# Patient Record
Sex: MALE | Race: BLACK OR AFRICAN AMERICAN | NOT HISPANIC OR LATINO | URBAN - METROPOLITAN AREA
[De-identification: names, ages, dates, MRNs, and addresses within clinical notes are randomized per-mention and may not be internally consistent; named-entity substitution may affect disease eponyms.]

---

## 2018-12-01 ENCOUNTER — OUTPATIENT (OUTPATIENT)
Dept: OUTPATIENT SERVICES | Facility: HOSPITAL | Age: 28
LOS: 1 days | End: 2018-12-01
Payer: MEDICAID

## 2018-12-01 PROCEDURE — G9001: CPT

## 2018-12-07 ENCOUNTER — EMERGENCY (EMERGENCY)
Facility: HOSPITAL | Age: 28
LOS: 1 days | Discharge: HOME | End: 2018-12-07
Attending: EMERGENCY MEDICINE | Admitting: EMERGENCY MEDICINE
Payer: MEDICAID

## 2018-12-07 VITALS
SYSTOLIC BLOOD PRESSURE: 128 MMHG | DIASTOLIC BLOOD PRESSURE: 84 MMHG | OXYGEN SATURATION: 100 % | TEMPERATURE: 99 F | HEART RATE: 91 BPM | RESPIRATION RATE: 16 BRPM

## 2018-12-07 VITALS
RESPIRATION RATE: 20 BRPM | SYSTOLIC BLOOD PRESSURE: 146 MMHG | TEMPERATURE: 101 F | OXYGEN SATURATION: 94 % | HEART RATE: 107 BPM | DIASTOLIC BLOOD PRESSURE: 105 MMHG

## 2018-12-07 LAB
ALBUMIN SERPL ELPH-MCNC: 4.7 G/DL — SIGNIFICANT CHANGE UP (ref 3.3–5)
ALP SERPL-CCNC: 102 U/L — SIGNIFICANT CHANGE UP (ref 40–120)
ALT FLD-CCNC: 37 U/L — SIGNIFICANT CHANGE UP (ref 4–41)
APPEARANCE UR: CLEAR — SIGNIFICANT CHANGE UP
APTT BLD: 31 SEC — SIGNIFICANT CHANGE UP (ref 27.5–36.3)
AST SERPL-CCNC: 28 U/L — SIGNIFICANT CHANGE UP (ref 4–40)
B PERT DNA SPEC QL NAA+PROBE: NOT DETECTED — SIGNIFICANT CHANGE UP
BASE EXCESS BLDV CALC-SCNC: 4.9 MMOL/L — SIGNIFICANT CHANGE UP
BASE EXCESS BLDV CALC-SCNC: 5 MMOL/L — SIGNIFICANT CHANGE UP
BASOPHILS # BLD AUTO: 0.03 K/UL — SIGNIFICANT CHANGE UP (ref 0–0.2)
BASOPHILS NFR BLD AUTO: 0.3 % — SIGNIFICANT CHANGE UP (ref 0–2)
BASOPHILS NFR SPEC: 0 % — SIGNIFICANT CHANGE UP (ref 0–2)
BILIRUB SERPL-MCNC: < 0.2 MG/DL — LOW (ref 0.2–1.2)
BILIRUB UR-MCNC: NEGATIVE — SIGNIFICANT CHANGE UP
BLASTS # FLD: 0 % — SIGNIFICANT CHANGE UP (ref 0–0)
BLOOD GAS VENOUS - CREATININE: 0.65 MG/DL — SIGNIFICANT CHANGE UP (ref 0.5–1.3)
BLOOD GAS VENOUS - CREATININE: 0.94 MG/DL — SIGNIFICANT CHANGE UP (ref 0.5–1.3)
BLOOD UR QL VISUAL: SIGNIFICANT CHANGE UP
BUN SERPL-MCNC: 15 MG/DL — SIGNIFICANT CHANGE UP (ref 7–23)
C PNEUM DNA SPEC QL NAA+PROBE: NOT DETECTED — SIGNIFICANT CHANGE UP
CALCIUM SERPL-MCNC: 9.4 MG/DL — SIGNIFICANT CHANGE UP (ref 8.4–10.5)
CHLORIDE BLDV-SCNC: 104 MMOL/L — SIGNIFICANT CHANGE UP (ref 96–108)
CHLORIDE BLDV-SCNC: 105 MMOL/L — SIGNIFICANT CHANGE UP (ref 96–108)
CHLORIDE SERPL-SCNC: 100 MMOL/L — SIGNIFICANT CHANGE UP (ref 98–107)
CO2 SERPL-SCNC: 28 MMOL/L — SIGNIFICANT CHANGE UP (ref 22–31)
COLOR SPEC: SIGNIFICANT CHANGE UP
CREAT SERPL-MCNC: 1 MG/DL — SIGNIFICANT CHANGE UP (ref 0.5–1.3)
EOSINOPHIL # BLD AUTO: 0.01 K/UL — SIGNIFICANT CHANGE UP (ref 0–0.5)
EOSINOPHIL NFR BLD AUTO: 0.1 % — SIGNIFICANT CHANGE UP (ref 0–6)
EOSINOPHIL NFR FLD: 0 % — SIGNIFICANT CHANGE UP (ref 0–6)
FLUAV H1 2009 PAND RNA SPEC QL NAA+PROBE: NOT DETECTED — SIGNIFICANT CHANGE UP
FLUAV H1 RNA SPEC QL NAA+PROBE: NOT DETECTED — SIGNIFICANT CHANGE UP
FLUAV H3 RNA SPEC QL NAA+PROBE: NOT DETECTED — SIGNIFICANT CHANGE UP
FLUAV SUBTYP SPEC NAA+PROBE: SIGNIFICANT CHANGE UP
FLUBV RNA SPEC QL NAA+PROBE: NOT DETECTED — SIGNIFICANT CHANGE UP
GAS PNL BLDV: 137 MMOL/L — SIGNIFICANT CHANGE UP (ref 136–146)
GAS PNL BLDV: 141 MMOL/L — SIGNIFICANT CHANGE UP (ref 136–146)
GLUCOSE BLDV-MCNC: 123 — HIGH (ref 70–99)
GLUCOSE BLDV-MCNC: 78 — SIGNIFICANT CHANGE UP (ref 70–99)
GLUCOSE SERPL-MCNC: 118 MG/DL — HIGH (ref 70–99)
GLUCOSE UR-MCNC: NEGATIVE — SIGNIFICANT CHANGE UP
HADV DNA SPEC QL NAA+PROBE: NOT DETECTED — SIGNIFICANT CHANGE UP
HCO3 BLDV-SCNC: 26 MMOL/L — SIGNIFICANT CHANGE UP (ref 20–27)
HCO3 BLDV-SCNC: 27 MMOL/L — SIGNIFICANT CHANGE UP (ref 20–27)
HCOV PNL SPEC NAA+PROBE: SIGNIFICANT CHANGE UP
HCT VFR BLD CALC: 47.7 % — SIGNIFICANT CHANGE UP (ref 39–50)
HCT VFR BLDV CALC: 42.6 % — SIGNIFICANT CHANGE UP (ref 39–51)
HCT VFR BLDV CALC: 47.7 % — SIGNIFICANT CHANGE UP (ref 39–51)
HGB BLD-MCNC: 15.3 G/DL — SIGNIFICANT CHANGE UP (ref 13–17)
HGB BLDV-MCNC: 13.9 G/DL — SIGNIFICANT CHANGE UP (ref 13–17)
HGB BLDV-MCNC: 15.6 G/DL — SIGNIFICANT CHANGE UP (ref 13–17)
HMPV RNA SPEC QL NAA+PROBE: NOT DETECTED — SIGNIFICANT CHANGE UP
HPIV1 RNA SPEC QL NAA+PROBE: NOT DETECTED — SIGNIFICANT CHANGE UP
HPIV2 RNA SPEC QL NAA+PROBE: NOT DETECTED — SIGNIFICANT CHANGE UP
HPIV3 RNA SPEC QL NAA+PROBE: NOT DETECTED — SIGNIFICANT CHANGE UP
HPIV4 RNA SPEC QL NAA+PROBE: NOT DETECTED — SIGNIFICANT CHANGE UP
IMM GRANULOCYTES # BLD AUTO: 0.04 # — SIGNIFICANT CHANGE UP
IMM GRANULOCYTES NFR BLD AUTO: 0.4 % — SIGNIFICANT CHANGE UP (ref 0–1.5)
INR BLD: 1.08 — SIGNIFICANT CHANGE UP (ref 0.88–1.17)
KETONES UR-MCNC: NEGATIVE — SIGNIFICANT CHANGE UP
LACTATE BLDV-MCNC: 2.1 MMOL/L — HIGH (ref 0.5–2)
LACTATE BLDV-MCNC: 2.5 MMOL/L — HIGH (ref 0.5–2)
LEUKOCYTE ESTERASE UR-ACNC: NEGATIVE — SIGNIFICANT CHANGE UP
LYMPHOCYTES # BLD AUTO: 0.45 K/UL — LOW (ref 1–3.3)
LYMPHOCYTES # BLD AUTO: 4.5 % — LOW (ref 13–44)
LYMPHOCYTES NFR SPEC AUTO: 0.9 % — LOW (ref 13–44)
MCHC RBC-ENTMCNC: 28.4 PG — SIGNIFICANT CHANGE UP (ref 27–34)
MCHC RBC-ENTMCNC: 32.1 % — SIGNIFICANT CHANGE UP (ref 32–36)
MCV RBC AUTO: 88.7 FL — SIGNIFICANT CHANGE UP (ref 80–100)
METAMYELOCYTES # FLD: 0.9 % — SIGNIFICANT CHANGE UP (ref 0–1)
MICROCYTES BLD QL: SLIGHT — SIGNIFICANT CHANGE UP
MONOCYTES # BLD AUTO: 0.77 K/UL — SIGNIFICANT CHANGE UP (ref 0–0.9)
MONOCYTES NFR BLD AUTO: 7.7 % — SIGNIFICANT CHANGE UP (ref 2–14)
MONOCYTES NFR BLD: 5.3 % — SIGNIFICANT CHANGE UP (ref 2–9)
MYELOCYTES NFR BLD: 0 % — SIGNIFICANT CHANGE UP (ref 0–0)
NEUTROPHIL AB SER-ACNC: 90.2 % — HIGH (ref 43–77)
NEUTROPHILS # BLD AUTO: 8.76 K/UL — HIGH (ref 1.8–7.4)
NEUTROPHILS NFR BLD AUTO: 87 % — HIGH (ref 43–77)
NEUTS BAND # BLD: 0 % — SIGNIFICANT CHANGE UP (ref 0–6)
NITRITE UR-MCNC: NEGATIVE — SIGNIFICANT CHANGE UP
NRBC # FLD: 0 — SIGNIFICANT CHANGE UP
OTHER - HEMATOLOGY %: 0 — SIGNIFICANT CHANGE UP
PCO2 BLDV: 53 MMHG — HIGH (ref 41–51)
PCO2 BLDV: 60 MMHG — HIGH (ref 41–51)
PH BLDV: 7.33 PH — SIGNIFICANT CHANGE UP (ref 7.32–7.43)
PH BLDV: 7.37 PH — SIGNIFICANT CHANGE UP (ref 7.32–7.43)
PH UR: 6.5 — SIGNIFICANT CHANGE UP (ref 5–8)
PHENYTOIN FREE SERPL-MCNC: 18.7 UG/ML — SIGNIFICANT CHANGE UP (ref 10–20)
PLATELET # BLD AUTO: 227 K/UL — SIGNIFICANT CHANGE UP (ref 150–400)
PLATELET COUNT - ESTIMATE: NORMAL — SIGNIFICANT CHANGE UP
PMV BLD: 10.7 FL — SIGNIFICANT CHANGE UP (ref 7–13)
PO2 BLDV: 32 MMHG — LOW (ref 35–40)
PO2 BLDV: 42 MMHG — HIGH (ref 35–40)
POTASSIUM BLDV-SCNC: 3.6 MMOL/L — SIGNIFICANT CHANGE UP (ref 3.4–4.5)
POTASSIUM BLDV-SCNC: 3.8 MMOL/L — SIGNIFICANT CHANGE UP (ref 3.4–4.5)
POTASSIUM SERPL-MCNC: 3.4 MMOL/L — LOW (ref 3.5–5.3)
POTASSIUM SERPL-SCNC: 3.4 MMOL/L — LOW (ref 3.5–5.3)
PROMYELOCYTES # FLD: 0 % — SIGNIFICANT CHANGE UP (ref 0–0)
PROT SERPL-MCNC: 8.3 G/DL — SIGNIFICANT CHANGE UP (ref 6–8.3)
PROT UR-MCNC: 10 — SIGNIFICANT CHANGE UP
PROTHROM AB SERPL-ACNC: 12.4 SEC — SIGNIFICANT CHANGE UP (ref 9.8–13.1)
RBC # BLD: 5.38 M/UL — SIGNIFICANT CHANGE UP (ref 4.2–5.8)
RBC # FLD: 14.4 % — SIGNIFICANT CHANGE UP (ref 10.3–14.5)
RSV RNA SPEC QL NAA+PROBE: NOT DETECTED — SIGNIFICANT CHANGE UP
RV+EV RNA SPEC QL NAA+PROBE: NOT DETECTED — SIGNIFICANT CHANGE UP
SAO2 % BLDV: 52.1 % — LOW (ref 60–85)
SAO2 % BLDV: 72.7 % — SIGNIFICANT CHANGE UP (ref 60–85)
SODIUM SERPL-SCNC: 142 MMOL/L — SIGNIFICANT CHANGE UP (ref 135–145)
SP GR SPEC: 1.01 — SIGNIFICANT CHANGE UP (ref 1–1.04)
UROBILINOGEN FLD QL: NORMAL — SIGNIFICANT CHANGE UP
VARIANT LYMPHS # BLD: 2.7 % — SIGNIFICANT CHANGE UP
WBC # BLD: 10.06 K/UL — SIGNIFICANT CHANGE UP (ref 3.8–10.5)
WBC # FLD AUTO: 10.06 K/UL — SIGNIFICANT CHANGE UP (ref 3.8–10.5)

## 2018-12-07 PROCEDURE — 71045 X-RAY EXAM CHEST 1 VIEW: CPT | Mod: 26

## 2018-12-07 PROCEDURE — 99285 EMERGENCY DEPT VISIT HI MDM: CPT | Mod: 25

## 2018-12-07 PROCEDURE — 70450 CT HEAD/BRAIN W/O DYE: CPT | Mod: 26

## 2018-12-07 PROCEDURE — 99282 EMERGENCY DEPT VISIT SF MDM: CPT

## 2018-12-07 RX ORDER — OXCARBAZEPINE 300 MG/1
300 TABLET, FILM COATED ORAL ONCE
Qty: 0 | Refills: 0 | Status: DISCONTINUED | OUTPATIENT
Start: 2018-12-07 | End: 2018-12-11

## 2018-12-07 RX ORDER — OXCARBAZEPINE 300 MG/1
300 TABLET, FILM COATED ORAL ONCE
Qty: 0 | Refills: 0 | Status: DISCONTINUED | OUTPATIENT
Start: 2018-12-07 | End: 2018-12-07

## 2018-12-07 RX ORDER — LEVETIRACETAM 250 MG/1
1000 TABLET, FILM COATED ORAL ONCE
Qty: 0 | Refills: 0 | Status: COMPLETED | OUTPATIENT
Start: 2018-12-07 | End: 2018-12-07

## 2018-12-07 RX ORDER — ACETAMINOPHEN 500 MG
650 TABLET ORAL ONCE
Qty: 0 | Refills: 0 | Status: COMPLETED | OUTPATIENT
Start: 2018-12-07 | End: 2018-12-07

## 2018-12-07 RX ORDER — SODIUM CHLORIDE 9 MG/ML
2000 INJECTION, SOLUTION INTRAVENOUS ONCE
Qty: 0 | Refills: 0 | Status: COMPLETED | OUTPATIENT
Start: 2018-12-07 | End: 2018-12-07

## 2018-12-07 RX ADMIN — Medication 650 MILLIGRAM(S): at 07:50

## 2018-12-07 RX ADMIN — Medication 650 MILLIGRAM(S): at 08:20

## 2018-12-07 RX ADMIN — SODIUM CHLORIDE 2000 MILLILITER(S): 9 INJECTION, SOLUTION INTRAVENOUS at 07:49

## 2018-12-07 RX ADMIN — LEVETIRACETAM 1000 MILLIGRAM(S): 250 TABLET, FILM COATED ORAL at 08:25

## 2018-12-07 NOTE — ED ADULT TRIAGE NOTE - CHIEF COMPLAINT QUOTE
pt. BIBA from home w/ c/o witnessed seizure-like activity at home, pt.'s brother reports the pt.'s R arm shaking uncontrollably for over a minute.  Pt. complaints w/ meds.  Febrile and tachycardic in triage.  PMHx seizure, developmental delay.

## 2018-12-07 NOTE — CONSULT NOTE ADULT - SUBJECTIVE AND OBJECTIVE BOX
Neurology Consult    Reason for consult: Seizure    HPI:  28yom w/ developmental delay,  shunt and seizures p/w RUE/RLE twitching. No change in mental status, consistent with prior seizure semiology.     REVIEW OF SYSTEMS:  As above    MEDICATIONS  levETIRAcetam 1000 milliGRAM(s) Oral once    PMH: Developmental delay  Seizure    PSH:     FAMILY HISTORY:    No history of dementia, strokes, or seizures     SOCIAL HISTORY:  No history of tobacco or alcohol use     Allergies    No Known Allergies    Intolerances    Vital Signs Last 24 Hrs  T(C): 38.4 (07 Dec 2018 06:22), Max: 38.4 (07 Dec 2018 06:22)  T(F): 101.2 (07 Dec 2018 06:22), Max: 101.2 (07 Dec 2018 06:22)  HR: 97 (07 Dec 2018 08:02) (97 - 107)  BP: 128/73 (07 Dec 2018 08:02) (128/73 - 146/105)  BP(mean): --  RR: 16 (07 Dec 2018 08:02) (16 - 20)  SpO2: 98% (07 Dec 2018 08:02) (94% - 98%)    Neurological Examination:    Mental Status: Patient is alert, awake, oriented X3. Patient is fluent, no dysarthria, no aphasia. Follows commands well and able to answer questions appropriately. Mood and affect normal.    Cranial Nerves: PERRL, EOMI, visual field intact, V1-V3 intact, no gross facial asymmetry, tongue/uvula midline    Motor Exam: No drift  Right upper extremity: 5/5  Left upper extremity: 5/5  Right lower extremity: 5/5  Left lower extremity: 5/5    Normal bulk/tone    Sensory: Intact to light touch and pinprick bilaterally. No extinction    Coordination: Finger to nose intact bilaterally     Gait: Normal      Reflexes: Bilateral 2+ Biceps, Brachial, Patellar, Ankle  Plantar: Down bilateral    GENERAL: No acute distress  HEENT:  Normocephalic, atraumatic  EXTREMITIES: No edema, clubbing, cyanosis  MUSCULOSKELETAL: Normal range of motion  SKIN: No rashes    LABS:    Hemoglobin A1C:     PT/INR - ( 07 Dec 2018 07:40 )   PT: 12.4 SEC;   INR: 1.08        PTT - ( 07 Dec 2018 07:40 )  PTT:31.0 SEC    RADIOLOGY:  CT head:  MRI: Neurology Consult    Reason for consult: Seizure    HPI:  28yom w/ developmental delay,  shunt and seizures p/w RUE twitching. No change in mental status, consistent with prior seizure semiology. First event occurred 12/6 1800, coming out of the bathroom, had a feeling he was going to have a seizure(unable to elaborate/describe feeling), lasted 2 minutes. Second event occurred in ambulance, patient doesn't remember event, described to him by brother. Patient urinated on himself during second event, no post ictal confusion, no tongue biting. Usually seizures come once a year, last 1 minute, semiology RUE twitching. Has had seizures since he was a child. Has been consistent taking medications, sees a neurologist at [facility] outpatient for management of seizure    REVIEW OF SYSTEMS:  As above    MEDICATIONS  levETIRAcetam 1000 milliGRAM(s) Oral once    PMH: Developmental delay  Seizure    PSH:     FAMILY HISTORY:    No history of dementia, strokes, or seizures     SOCIAL HISTORY:  No history of tobacco or alcohol use     Allergies    No Known Allergies    Intolerances    Vital Signs Last 24 Hrs  T(C): 38.4 (07 Dec 2018 06:22), Max: 38.4 (07 Dec 2018 06:22)  T(F): 101.2 (07 Dec 2018 06:22), Max: 101.2 (07 Dec 2018 06:22)  HR: 97 (07 Dec 2018 08:02) (97 - 107)  BP: 128/73 (07 Dec 2018 08:02) (128/73 - 146/105)  BP(mean): --  RR: 16 (07 Dec 2018 08:02) (16 - 20)  SpO2: 98% (07 Dec 2018 08:02) (94% - 98%)    Neurological Examination:    Mental Status: Patient is alert, awake, oriented X3. Patient is fluent, no dysarthria, no aphasia. Follows commands well and able to answer questions appropriately. Mood and affect normal.    Cranial Nerves: PERRL, EOMI, visual field intact, V1-V3 intact, no gross facial asymmetry, tongue/uvula midline    Motor Exam: No drift  Right upper extremity: 5/5  Left upper extremity: 5/5  Right lower extremity: 5/5  Left lower extremity: 5/5    Normal bulk/tone    Sensory: Intact to light touch and pinprick bilaterally. No extinction    Coordination: Finger to nose intact bilaterally     Gait: Normal      Reflexes: Bilateral 2+ Biceps, Brachial, Patellar, Ankle  Plantar: Down bilateral    GENERAL: No acute distress  HEENT:  Normocephalic, atraumatic  EXTREMITIES: No edema, clubbing, cyanosis  MUSCULOSKELETAL: Normal range of motion  SKIN: No rashes    LABS:    Hemoglobin A1C:     PT/INR - ( 07 Dec 2018 07:40 )   PT: 12.4 SEC;   INR: 1.08        PTT - ( 07 Dec 2018 07:40 )  PTT:31.0 SEC    RADIOLOGY:  CT head:  MRI: Neurology Consult    Reason for consult: Seizure    HPI:  28yom w/ developmental delay,  shunt and seizures p/w RUE twitching. No change in mental status, consistent with prior seizure semiology. First event occurred 12/6 1800, coming out of the bathroom, had a feeling he was going to have a seizure(unable to elaborate/describe feeling), lasted 2 minutes. Second event occurred in ambulance, patient doesn't remember event, described to him by brother. Patient urinated on himself during second event, no post ictal confusion, no tongue biting. Usually seizures come once a year, last 1 minute, semiology RUE twitching. Has had seizures since he was a child. Has been consistent taking medications, sees a neurologist at [facility] outpatient for management of seizure    REVIEW OF SYSTEMS:  As above    MEDICATIONS  levETIRAcetam 1000 milliGRAM(s) Oral once    PMH: Developmental delay  Seizure    PSH:     FAMILY HISTORY:    No history of dementia, strokes, or seizures     SOCIAL HISTORY:  No history of tobacco or alcohol use     Allergies    No Known Allergies    Intolerances    Vital Signs Last 24 Hrs  T(C): 38.4 (07 Dec 2018 06:22), Max: 38.4 (07 Dec 2018 06:22)  T(F): 101.2 (07 Dec 2018 06:22), Max: 101.2 (07 Dec 2018 06:22)  HR: 97 (07 Dec 2018 08:02) (97 - 107)  BP: 128/73 (07 Dec 2018 08:02) (128/73 - 146/105)  BP(mean): --  RR: 16 (07 Dec 2018 08:02) (16 - 20)  SpO2: 98% (07 Dec 2018 08:02) (94% - 98%)    Neurological Examination:    Mental Status: Patient is alert, awake, oriented. Patient is fluent, no dysarthria, no aphasia. Follows commands well and able to answer questions appropriately. Mood and affect normal.    Cranial Nerves: PERRL, EOMI, visual field intact, V1-V3 intact, no gross facial asymmetry, tongue/uvula midline    Motor Exam: No drift  Right upper extremity: Hand  impaired due to R hand deformity, 5/5 otherwise  Left upper extremity: 5/5  Right lower extremity: 5/5  Left lower extremity: 5/5    Normal bulk/tone    Sensory: Intact to light touch bilaterally. No extinction    Coordination: Finger to nose intact bilaterally     Reflexes: Bilateral 2+ Biceps, Brachial, Patellar, Ankle  Plantar: Down bilateral    GENERAL: No acute distress  HEENT:  Normocephalic, atraumatic  EXTREMITIES: No edema, clubbing, cyanosis  MUSCULOSKELETAL: Normal range of motion  SKIN: No rashes    LABS:    Hemoglobin A1C:     PT/INR - ( 07 Dec 2018 07:40 )   PT: 12.4 SEC;   INR: 1.08        PTT - ( 07 Dec 2018 07:40 )  PTT:31.0 SEC    RADIOLOGY:  CT head:  MRI: Neurology Consult    Reason for consult: Seizure    HPI:  28yom w/ developmental delay,  shunt and seizures p/w RUE twitching. No change in mental status, consistent with prior seizure semiology. First event occurred 12/6 1800, coming out of the bathroom, had a feeling he was going to have a seizure(unable to elaborate/describe feeling), lasted 2 minutes. Second event occurred in ambulance, patient doesn't remember event, described to him by brother. Patient urinated on himself during second event, no post ictal confusion, no tongue biting. Usually seizures come once a year, last 1 minute, semiology RUE twitching. Has had seizures since he was a child. Has been consistent taking medications, sees a neurologist at [facility] outpatient for management of seizure. Denies headache, nausea, vomiting, neck stiffness.     REVIEW OF SYSTEMS:  As above    MEDICATIONS  Phenytoin 100mg ER BID  Keppra 1g TID  Trileptal 300mg TID    PMH: Developmental delay  Seizure    PSH:     FAMILY HISTORY:    No history of dementia, strokes, or seizures     SOCIAL HISTORY:  No history of tobacco or alcohol use     Allergies    No Known Allergies    Intolerances    Vital Signs Last 24 Hrs  T(C): 38.4 (07 Dec 2018 06:22), Max: 38.4 (07 Dec 2018 06:22)  T(F): 101.2 (07 Dec 2018 06:22), Max: 101.2 (07 Dec 2018 06:22)  HR: 97 (07 Dec 2018 08:02) (97 - 107)  BP: 128/73 (07 Dec 2018 08:02) (128/73 - 146/105)  BP(mean): --  RR: 16 (07 Dec 2018 08:02) (16 - 20)  SpO2: 98% (07 Dec 2018 08:02) (94% - 98%)    Neurological Examination:    Mental Status: Patient is alert, awake, oriented. Patient is fluent, no dysarthria, no aphasia. Follows commands well and able to answer questions appropriately. Mood and affect normal.    Cranial Nerves: PERRL, EOMI, visual field intact, V1-V3 intact, no gross facial asymmetry, tongue/uvula midline    Motor Exam: No drift  Right upper extremity: Hand  impaired due to R hand deformity, 5/5 otherwise  Left upper extremity: 5/5  Right lower extremity: 5/5  Left lower extremity: 5/5    Kernig/Brudzinski negative    Normal bulk/tone    Sensory: Intact to light touch bilaterally. No extinction    Coordination: Finger to nose intact bilaterally     Reflexes: Bilateral 2+ Biceps, Brachial, Patellar, Ankle  Plantar: Down bilateral    GENERAL: No acute distress  HEENT:  Normocephalic, atraumatic  EXTREMITIES: No edema, clubbing, cyanosis  MUSCULOSKELETAL: Normal range of motion  SKIN: No rashes    LABS:    Hemoglobin A1C:     PT/INR - ( 07 Dec 2018 07:40 )   PT: 12.4 SEC;   INR: 1.08        PTT - ( 07 Dec 2018 07:40 )  PTT:31.0 SEC    RADIOLOGY:  CT head:  MRI:

## 2018-12-07 NOTE — ED ADULT NURSE NOTE - NSIMPLEMENTINTERV_GEN_ALL_ED
Implemented All Fall with Harm Risk Interventions:  [place] to call system. Call bell, personal items and telephone within reach. Instruct patient to call for assistance. Room bathroom lighting operational. Non-slip footwear when patient is off stretcher. Physically safe environment: no spills, clutter or unnecessary equipment. Stretcher in lowest position, wheels locked, appropriate side rails in place. Provide visual cue, wrist band, yellow gown, etc. Monitor gait and stability. Monitor for mental status changes and reorient to person, place, and time. Review medications for side effects contributing to fall risk. Reinforce activity limits and safety measures with patient and family. Provide visual clues: red socks.

## 2018-12-07 NOTE — ED PROVIDER NOTE - CARE PROVIDER_API CALL
Guido Husain), Clinical Neurophysiology; Neurology  [address] [address]  Phone: [phone]  Fax: [phone]

## 2018-12-07 NOTE — ED PROVIDER NOTE - OBJECTIVE STATEMENT
28yom w/ developmental delay,  shunt and seizures had two witnessed seizures this morning, the first lasting about a minute with focal right arm and leg twitching, no change in mental status or post ictal period, consistent with usual seizure. Had a second episode of the same during ambulance transport. No intervention prior to ED arrival. Endorses a mild, right sided headache which is normal after seizure. Taking dilantin, keppra and tegretol with adherence to regimen. No recent fevers, chills, cough, congestion, abdominal pain, nausea, vomiting, diarrhea, urinary symptoms. No sick contacts, travel or known exposures. Last seizure approx 3 months ago, no recent medication adjustments. Follows w/ [facility] clinic. 28yom w/ developmental delay,  shunt and seizures had two witnessed seizures this morning, the first lasting about a minute with focal right arm and leg twitching, no change in mental status or post ictal period, consistent with usual seizure. Had a second episode of the same during ambulance transport. No intervention prior to ED arrival. Endorses a mild, right sided headache which is normal after seizure. Taking dilantin, keppra and trileptal with adherence to regimen. No recent fevers, chills, cough, congestion, abdominal pain, nausea, vomiting, diarrhea, urinary symptoms. No sick contacts, travel or known exposures. Last seizure approx 3 months ago, no recent medication adjustments. Follows w/ [facility] clinic.

## 2018-12-07 NOTE — CONSULT NOTE ADULT - ATTENDING COMMENTS
Patient reports 2 breakthough right arm focal motor seizures this morning.  Compliant with taking AEDs.  Need to confirm AED doses, before making AED adjustments.   Will discuss further with neurology residents.   Agree with CEEG monitoring.  Seizure precautions.    Guido Husain MD  EEG / Epilepsy Attending Physician

## 2018-12-07 NOTE — ED PROVIDER NOTE - ATTENDING CONTRIBUTION TO CARE
28M H/o DD s/p  shunt on keppra dilantin, tegretol.  Usual sz, focal R side arm twitching, non post ictal or incontinence.  Happened again in EMS.  Found to be febrile here and tachycardia.  Verbal here, c/o mild HA, no meningismus.  Exam benign here aside from VS abnormality.  Pt was admitted in 2015 with a very similar story, had VEEG, no acute findings, pt was d/c home.  Pt follows up with Nsx and neuro at [facility].  Sz meds - dilantin, keppra, oxcarbamazepine.  Fever likely due to sz, no localizing sign - but will check labs lactate, CXR, urine; neuro.     VS:  unremarkable    GEN - malaise, ; A+O x3   HEAD - NC/AT     ENT - PEERL, EOMI, mucous membranes  moist , no discharge      NECK: Neck supple, non-tender without lymphadenopathy, no masses, no JVD  PULM - CTA b/l,  symmetric breath sounds  COR -  normal heart sounds    ABD - , ND, NT, soft,  BACK - no CVA tenderness, nontender spine     EXTREMS - no edema, no deformity, warm and well perfused  R arm and leg contracture/weakness (old)  SKIN - no rash or bruising      NEUROLOGIC - alert, CN 2-12 intact, sensation nl, motor no focal deficit.

## 2018-12-07 NOTE — ED PROVIDER NOTE - MEDICAL DECISION MAKING DETAILS
Known seizure history with two breakthrough seizures, now found to be febrile and tachycardic. No clear source of infection apparent in history or physical. Will get cultures, UA, CXR and RVP given SIRS response, however this may be due to seizure activity and since the pt does not have any signs of systemic infection, will hold antibiotics pending work up.

## 2018-12-07 NOTE — ED PROVIDER NOTE - NSFOLLOWUPINSTRUCTIONS_ED_ALL_ED_FT
Follow up with your neurologist, or you may follow up with Dr. Husain. Call [phone] to schedule an appointment.   Continue Keppra and DIlantin as prescribed.  Increase Trileptal (Oxcarbamazepine) to 300mg in the morning, 300mg in the afternoon, and 600mg at night.  Return to the ER immediately for persistent seizure activity, recurrent fever, chills, cough, abdominal pain, nausea, vomiting, diarrhea, headache or any other new symptoms.

## 2018-12-07 NOTE — ED ADULT NURSE NOTE - OBJECTIVE STATEMENT
Pt received to room 26 a/o x 3 sp 2 witnessessed seizures. pt had one seizure at home and one in the ambulance to the hospital. Pt brother states he has right sided seizures which is typical for him and they only lasted for alittle more then a min. Pt states he is compliant with his medications and his last seizure was 3 months ago. Respirations even and unlabored. Lung sounds clear with equal chest rise bilaterally. ABD is soft, non tender, non distended with normal active bowel sounds No complaints of chest pain, headache, nausea, dizziness, vomiting  SOB, fever, chills verbalized.. 22g IV placed to left AC labs drawn ans sent. Pt given medication and fluids as per order. Pt noted to be tachy and febril at triage

## 2018-12-07 NOTE — CONSULT NOTE ADULT - ASSESSMENT
28yom w/ developmental delay,  shunt and seizures p/w RUE/RLE twitching. No change in mental status, consistent with prior seizure semiology.     Recs: 28yom w/ developmental delay,  shunt and seizures p/w RUE/RLE twitching. No change in mental status, consistent with prior seizure semiology. Patient febrile(101.2), concern for breakthrough seizure due to infection or possible toxic/metabolic disturbance. Kernig/brudzinski negative, no headache or leukocytosis; suspicion low for meningitis/encephalitis at this time, but would consider tapping  shunt if toxic/metabolic/infectious workup otherwise negative.     Recs:  vEEG  Dilantin, trileptal levels  Would attempt to contact neurologist outpatient with regards to management of AEDs  Consider neurosurgery consult for  shunt tap

## 2018-12-08 LAB
BACTERIA UR CULT: SIGNIFICANT CHANGE UP
SPECIMEN SOURCE: SIGNIFICANT CHANGE UP

## 2018-12-10 DIAGNOSIS — Z71.89 OTHER SPECIFIED COUNSELING: ICD-10-CM

## 2018-12-12 LAB
BACTERIA BLD CULT: SIGNIFICANT CHANGE UP
BACTERIA BLD CULT: SIGNIFICANT CHANGE UP
OXCARBAZEPINE SERPL-MCNC: 9 UG/ML — LOW (ref 10–35)

## 2019-01-28 ENCOUNTER — EMERGENCY (EMERGENCY)
Facility: HOSPITAL | Age: 29
LOS: 0 days | Discharge: HOME | End: 2019-01-28
Attending: EMERGENCY MEDICINE
Payer: MEDICAID

## 2019-01-28 VITALS
HEIGHT: 66 IN | SYSTOLIC BLOOD PRESSURE: 77 MMHG | RESPIRATION RATE: 18 BRPM | HEART RATE: 115 BPM | OXYGEN SATURATION: 97 % | WEIGHT: 179.9 LBS | TEMPERATURE: 101 F | DIASTOLIC BLOOD PRESSURE: 43 MMHG

## 2019-01-28 VITALS
OXYGEN SATURATION: 96 % | SYSTOLIC BLOOD PRESSURE: 115 MMHG | DIASTOLIC BLOOD PRESSURE: 79 MMHG | RESPIRATION RATE: 19 BRPM | HEART RATE: 86 BPM

## 2019-01-28 DIAGNOSIS — I10 ESSENTIAL (PRIMARY) HYPERTENSION: ICD-10-CM

## 2019-01-28 DIAGNOSIS — G40.909 EPILEPSY, UNSPECIFIED, NOT INTRACTABLE, WITHOUT STATUS EPILEPTICUS: ICD-10-CM

## 2019-01-28 DIAGNOSIS — J18.9 PNEUMONIA, UNSPECIFIED ORGANISM: ICD-10-CM

## 2019-01-28 LAB
ALBUMIN SERPL ELPH-MCNC: 3 G/DL — LOW (ref 3.3–5)
ALP SERPL-CCNC: 71 U/L — SIGNIFICANT CHANGE UP (ref 40–120)
ALT FLD-CCNC: 39 U/L — SIGNIFICANT CHANGE UP (ref 12–78)
ANION GAP SERPL CALC-SCNC: 16 MMOL/L — SIGNIFICANT CHANGE UP (ref 5–17)
APPEARANCE UR: CLEAR — SIGNIFICANT CHANGE UP
AST SERPL-CCNC: 19 U/L — SIGNIFICANT CHANGE UP (ref 15–37)
BACTERIA # UR AUTO: ABNORMAL
BASOPHILS # BLD AUTO: 0.03 K/UL — SIGNIFICANT CHANGE UP (ref 0–0.2)
BASOPHILS NFR BLD AUTO: 0.2 % — SIGNIFICANT CHANGE UP (ref 0–2)
BILIRUB SERPL-MCNC: 0.1 MG/DL — LOW (ref 0.2–1.2)
BILIRUB UR-MCNC: NEGATIVE — SIGNIFICANT CHANGE UP
BUN SERPL-MCNC: 9 MG/DL — SIGNIFICANT CHANGE UP (ref 7–23)
CALCIUM SERPL-MCNC: 7.6 MG/DL — LOW (ref 8.5–10.1)
CHLORIDE SERPL-SCNC: 110 MMOL/L — HIGH (ref 96–108)
CO2 SERPL-SCNC: 19 MMOL/L — LOW (ref 22–31)
COLOR SPEC: YELLOW — SIGNIFICANT CHANGE UP
COMMENT - URINE: SIGNIFICANT CHANGE UP
CREAT SERPL-MCNC: 1.23 MG/DL — SIGNIFICANT CHANGE UP (ref 0.5–1.3)
DIFF PNL FLD: ABNORMAL
EOSINOPHIL # BLD AUTO: 0.05 K/UL — SIGNIFICANT CHANGE UP (ref 0–0.5)
EOSINOPHIL NFR BLD AUTO: 0.4 % — SIGNIFICANT CHANGE UP (ref 0–6)
EPI CELLS # UR: SIGNIFICANT CHANGE UP
ETHANOL SERPL-MCNC: <10 MG/DL — SIGNIFICANT CHANGE UP (ref 0–10)
FLU A RESULT: SIGNIFICANT CHANGE UP
FLU A RESULT: SIGNIFICANT CHANGE UP
FLUAV AG NPH QL: SIGNIFICANT CHANGE UP
FLUBV AG NPH QL: SIGNIFICANT CHANGE UP
GLUCOSE BLDC GLUCOMTR-MCNC: 158 MG/DL — HIGH (ref 70–99)
GLUCOSE SERPL-MCNC: 152 MG/DL — HIGH (ref 70–99)
GLUCOSE UR QL: NEGATIVE MG/DL — SIGNIFICANT CHANGE UP
GRAN CASTS # UR COMP ASSIST: ABNORMAL /LPF
HCT VFR BLD CALC: 41.5 % — SIGNIFICANT CHANGE UP (ref 39–50)
HGB BLD-MCNC: 12.8 G/DL — LOW (ref 13–17)
HYALINE CASTS # UR AUTO: ABNORMAL /LPF
IMM GRANULOCYTES NFR BLD AUTO: 1.1 % — SIGNIFICANT CHANGE UP (ref 0–1.5)
KETONES UR-MCNC: NEGATIVE — SIGNIFICANT CHANGE UP
LACTATE SERPL-SCNC: 1.8 MMOL/L — SIGNIFICANT CHANGE UP (ref 0.7–2)
LACTATE SERPL-SCNC: 9.1 MMOL/L — CRITICAL HIGH (ref 0.7–2)
LEUKOCYTE ESTERASE UR-ACNC: NEGATIVE — SIGNIFICANT CHANGE UP
LIDOCAIN IGE QN: 75 U/L — SIGNIFICANT CHANGE UP (ref 73–393)
LYMPHOCYTES # BLD AUTO: 1.16 K/UL — SIGNIFICANT CHANGE UP (ref 1–3.3)
LYMPHOCYTES # BLD AUTO: 9 % — LOW (ref 13–44)
MCHC RBC-ENTMCNC: 28.3 PG — SIGNIFICANT CHANGE UP (ref 27–34)
MCHC RBC-ENTMCNC: 30.8 GM/DL — LOW (ref 32–36)
MCV RBC AUTO: 91.6 FL — SIGNIFICANT CHANGE UP (ref 80–100)
MONOCYTES # BLD AUTO: 0.76 K/UL — SIGNIFICANT CHANGE UP (ref 0–0.9)
MONOCYTES NFR BLD AUTO: 5.9 % — SIGNIFICANT CHANGE UP (ref 2–14)
NEUTROPHILS # BLD AUTO: 10.81 K/UL — HIGH (ref 1.8–7.4)
NEUTROPHILS NFR BLD AUTO: 83.4 % — HIGH (ref 43–77)
NITRITE UR-MCNC: NEGATIVE — SIGNIFICANT CHANGE UP
NRBC # BLD: 0 /100 WBCS — SIGNIFICANT CHANGE UP (ref 0–0)
NT-PROBNP SERPL-SCNC: 94 PG/ML — SIGNIFICANT CHANGE UP (ref 0–125)
PH UR: 7 — SIGNIFICANT CHANGE UP (ref 5–8)
PHENYTOIN FREE SERPL-MCNC: 11.4 UG/ML — SIGNIFICANT CHANGE UP (ref 10–20)
PLATELET # BLD AUTO: 307 K/UL — SIGNIFICANT CHANGE UP (ref 150–400)
POTASSIUM SERPL-MCNC: 3.5 MMOL/L — SIGNIFICANT CHANGE UP (ref 3.5–5.3)
POTASSIUM SERPL-SCNC: 3.5 MMOL/L — SIGNIFICANT CHANGE UP (ref 3.5–5.3)
PROT SERPL-MCNC: 6.4 GM/DL — SIGNIFICANT CHANGE UP (ref 6–8.3)
PROT UR-MCNC: 30 MG/DL
RBC # BLD: 4.53 M/UL — SIGNIFICANT CHANGE UP (ref 4.2–5.8)
RBC # FLD: 13.6 % — SIGNIFICANT CHANGE UP (ref 10.3–14.5)
RBC CASTS # UR COMP ASSIST: SIGNIFICANT CHANGE UP /HPF (ref 0–4)
RSV RESULT: SIGNIFICANT CHANGE UP
RSV RNA RESP QL NAA+PROBE: SIGNIFICANT CHANGE UP
SODIUM SERPL-SCNC: 145 MMOL/L — SIGNIFICANT CHANGE UP (ref 135–145)
SP GR SPEC: 1.01 — SIGNIFICANT CHANGE UP (ref 1.01–1.02)
UROBILINOGEN FLD QL: NEGATIVE MG/DL — SIGNIFICANT CHANGE UP
WBC # BLD: 12.95 K/UL — HIGH (ref 3.8–10.5)
WBC # FLD AUTO: 12.95 K/UL — HIGH (ref 3.8–10.5)
WBC UR QL: SIGNIFICANT CHANGE UP

## 2019-01-28 PROCEDURE — 99285 EMERGENCY DEPT VISIT HI MDM: CPT | Mod: 25

## 2019-01-28 PROCEDURE — 71045 X-RAY EXAM CHEST 1 VIEW: CPT | Mod: 26

## 2019-01-28 PROCEDURE — 70450 CT HEAD/BRAIN W/O DYE: CPT | Mod: 26

## 2019-01-28 PROCEDURE — 93010 ELECTROCARDIOGRAM REPORT: CPT

## 2019-01-28 RX ORDER — ACETAMINOPHEN 500 MG
650 TABLET ORAL ONCE
Refills: 0 | Status: COMPLETED | OUTPATIENT
Start: 2019-01-28 | End: 2019-01-28

## 2019-01-28 RX ORDER — CEFTRIAXONE 500 MG/1
1 INJECTION, POWDER, FOR SOLUTION INTRAMUSCULAR; INTRAVENOUS ONCE
Refills: 0 | Status: COMPLETED | OUTPATIENT
Start: 2019-01-28 | End: 2019-01-28

## 2019-01-28 RX ORDER — AZITHROMYCIN 500 MG/1
500 TABLET, FILM COATED ORAL ONCE
Refills: 0 | Status: COMPLETED | OUTPATIENT
Start: 2019-01-28 | End: 2019-01-28

## 2019-01-28 RX ORDER — SODIUM CHLORIDE 9 MG/ML
2000 INJECTION INTRAMUSCULAR; INTRAVENOUS; SUBCUTANEOUS ONCE
Refills: 0 | Status: COMPLETED | OUTPATIENT
Start: 2019-01-28 | End: 2019-01-28

## 2019-01-28 RX ORDER — OXCARBAZEPINE 300 MG/1
300 TABLET, FILM COATED ORAL ONCE
Refills: 0 | Status: COMPLETED | OUTPATIENT
Start: 2019-01-28 | End: 2019-01-28

## 2019-01-28 RX ORDER — LEVETIRACETAM 250 MG/1
1000 TABLET, FILM COATED ORAL ONCE
Refills: 0 | Status: COMPLETED | OUTPATIENT
Start: 2019-01-28 | End: 2019-01-28

## 2019-01-28 RX ORDER — SODIUM CHLORIDE 9 MG/ML
1000 INJECTION INTRAMUSCULAR; INTRAVENOUS; SUBCUTANEOUS ONCE
Refills: 0 | Status: COMPLETED | OUTPATIENT
Start: 2019-01-28 | End: 2019-01-28

## 2019-01-28 RX ORDER — AZITHROMYCIN 500 MG/1
1 TABLET, FILM COATED ORAL
Qty: 4 | Refills: 0
Start: 2019-01-28 | End: 2019-01-31

## 2019-01-28 RX ADMIN — Medication 650 MILLIGRAM(S): at 15:19

## 2019-01-28 RX ADMIN — SODIUM CHLORIDE 1000 MILLILITER(S): 9 INJECTION INTRAMUSCULAR; INTRAVENOUS; SUBCUTANEOUS at 09:11

## 2019-01-28 RX ADMIN — OXCARBAZEPINE 300 MILLIGRAM(S): 300 TABLET, FILM COATED ORAL at 15:31

## 2019-01-28 RX ADMIN — SODIUM CHLORIDE 2000 MILLILITER(S): 9 INJECTION INTRAMUSCULAR; INTRAVENOUS; SUBCUTANEOUS at 09:11

## 2019-01-28 RX ADMIN — Medication 100 MILLIGRAM(S): at 19:43

## 2019-01-28 RX ADMIN — SODIUM CHLORIDE 1000 MILLILITER(S): 9 INJECTION INTRAMUSCULAR; INTRAVENOUS; SUBCUTANEOUS at 15:20

## 2019-01-28 RX ADMIN — LEVETIRACETAM 1000 MILLIGRAM(S): 250 TABLET, FILM COATED ORAL at 09:11

## 2019-01-28 RX ADMIN — CEFTRIAXONE 100 GRAM(S): 500 INJECTION, POWDER, FOR SOLUTION INTRAMUSCULAR; INTRAVENOUS at 09:21

## 2019-01-28 RX ADMIN — OXCARBAZEPINE 300 MILLIGRAM(S): 300 TABLET, FILM COATED ORAL at 19:45

## 2019-01-28 RX ADMIN — LEVETIRACETAM 400 MILLIGRAM(S): 250 TABLET, FILM COATED ORAL at 08:17

## 2019-01-28 RX ADMIN — AZITHROMYCIN 500 MILLIGRAM(S): 500 TABLET, FILM COATED ORAL at 15:19

## 2019-01-28 RX ADMIN — SODIUM CHLORIDE 1000 MILLILITER(S): 9 INJECTION INTRAMUSCULAR; INTRAVENOUS; SUBCUTANEOUS at 08:17

## 2019-01-28 RX ADMIN — Medication 100 MILLIGRAM(S): at 15:32

## 2019-01-28 RX ADMIN — CEFTRIAXONE 1 GRAM(S): 500 INJECTION, POWDER, FOR SOLUTION INTRAMUSCULAR; INTRAVENOUS at 15:19

## 2019-01-28 RX ADMIN — AZITHROMYCIN 255 MILLIGRAM(S): 500 TABLET, FILM COATED ORAL at 10:31

## 2019-01-28 RX ADMIN — Medication 650 MILLIGRAM(S): at 08:17

## 2019-01-28 NOTE — ED ADULT TRIAGE NOTE - CHIEF COMPLAINT QUOTE
patient BIBA for seizure , patient got Versed 10 mg IM from EMS , patient BIBA for seizure , patient got Versed 10 mg IM from EMS , directed patient to bed 10 , patient unresponsive

## 2019-01-28 NOTE — ED ADULT NURSE NOTE - CHIEF COMPLAINT QUOTE
patient BIBA for seizure , patient got Versed 10 mg IM from EMS , directed patient to bed 10 , patient unresponsive

## 2019-01-28 NOTE — ED PROVIDER NOTE - PROGRESS NOTE DETAILS
Pt back to baseline mental status, lactate cleared, pt feeling better. Mother and brother eager for discharge along with patient. Pt given seizure medications, has prescription for medications at home already, pt ok for d/c, to f/u w pmd and neurologist. Return precautions given.

## 2019-01-28 NOTE — ED ADULT NURSE REASSESSMENT NOTE - NS ED NURSE REASSESS COMMENT FT1
Pt more arousable  able to answer questions and make needs known. No seizure activity noted in ED while in my care. Pt continue to be monitored on CM awaiting repeat labs, family at bedside

## 2019-01-28 NOTE — ED ADULT NURSE NOTE - NSIMPLEMENTINTERV_GEN_ALL_ED
Implemented All Fall with Harm Risk Interventions:  [place] to call system. Call bell, personal items and telephone within reach. Instruct patient to call for assistance. Room bathroom lighting operational. Non-slip footwear when patient is off stretcher. Physically safe environment: no spills, clutter or unnecessary equipment. Stretcher in lowest position, wheels locked, appropriate side rails in place. Provide visual cue, wrist band, yellow gown, etc. Monitor gait and stability. Monitor for mental status changes and reorient to person, place, and time. Review medications for side effects contributing to fall risk. Reinforce activity limits and safety measures with patient and family. Provide visual clues: red socks.

## 2019-01-28 NOTE — ED PROVIDER NOTE - MEDICAL DECISION MAKING DETAILS
Ddx: Seizure, post ictal/ fever at triage, ro infection/ no neck stiffness or headache prior to suggest meningitis  Plan: Cbc, cmp, lactate, fluids, blood cx, cxr, ua, reassess

## 2019-01-28 NOTE — ED ADULT NURSE NOTE - OBJECTIVE STATEMENT
Pt arrived by EMS s/p seizure activity at home and on EMS bus. Pt placed on CM, EKG and blood done. Safety maintained, stretcher rails padded. Per mother pt is compliant with mediations, last seizure was in September 2018

## 2019-01-28 NOTE — ED ADULT NURSE REASSESSMENT NOTE - NS ED NURSE REASSESS COMMENT FT1
Pt more arousable, open eyes upon name calling. No seizure activity noted in ED at this time. Family at bedside, safety maintained

## 2019-01-28 NOTE — ED ADULT NURSE REASSESSMENT NOTE - NS ED NURSE REASSESS COMMENT FT1
Pt states he is unable to keep his eyes open, states "seizure knocked me out". SW consult for transportation arrangements. No seizure activity noted while in ED. Pt continue be monitored, family at bedside

## 2019-01-28 NOTE — ED PROVIDER NOTE - OBJECTIVE STATEMENT
Pt is a 27 yo gentleman with a pmhx of seizures and HTN who presents to the ED with seizure. He woke up his mother this morning saying he started to have a seizure, starting in his R. hand as per usual, and then generalized to having a tonic clonic seizure. Ems arrived, noticed continuing seizure activity and gave 10 mg IM versed, stopping seizure activity. Pt last had seizure in September. Has not been on his medication since Friday because was not able to  at pharmacy. No complaints of any pains, no fevers at home, no cough. Post ictal currently.

## 2019-01-29 LAB
CULTURE RESULTS: SIGNIFICANT CHANGE UP
LEVETIRACETAM SERPL-MCNC: <2 MCG/ML — LOW (ref 12–46)
SPECIMEN SOURCE: SIGNIFICANT CHANGE UP

## 2019-01-30 LAB — PHENYTOIN FREE SERPL-MCNC: 1 MG/L — SIGNIFICANT CHANGE UP (ref 1–2)

## 2019-02-02 LAB
CULTURE RESULTS: SIGNIFICANT CHANGE UP
CULTURE RESULTS: SIGNIFICANT CHANGE UP
SPECIMEN SOURCE: SIGNIFICANT CHANGE UP
SPECIMEN SOURCE: SIGNIFICANT CHANGE UP

## 2019-06-03 ENCOUNTER — INPATIENT (INPATIENT)
Facility: HOSPITAL | Age: 29
LOS: 2 days | Discharge: HOME | End: 2019-06-06
Attending: HOSPITALIST | Admitting: HOSPITALIST
Payer: MEDICAID

## 2019-06-03 VITALS
TEMPERATURE: 98 F | DIASTOLIC BLOOD PRESSURE: 92 MMHG | SYSTOLIC BLOOD PRESSURE: 159 MMHG | RESPIRATION RATE: 16 BRPM | OXYGEN SATURATION: 100 % | HEART RATE: 146 BPM

## 2019-06-03 NOTE — ED ADULT TRIAGE NOTE - CHIEF COMPLAINT QUOTE
asleep with aura for having seizures. Patient repeating phrases  and presents as per brother like is he is having seizures. Seizures Hx developmental delay contracture to right arm and leg

## 2019-06-04 DIAGNOSIS — R56.9 UNSPECIFIED CONVULSIONS: ICD-10-CM

## 2019-06-04 DIAGNOSIS — I10 ESSENTIAL (PRIMARY) HYPERTENSION: ICD-10-CM

## 2019-06-04 DIAGNOSIS — Z29.9 ENCOUNTER FOR PROPHYLACTIC MEASURES, UNSPECIFIED: ICD-10-CM

## 2019-06-04 DIAGNOSIS — Z87.39 PERSONAL HISTORY OF OTHER DISEASES OF THE MUSCULOSKELETAL SYSTEM AND CONNECTIVE TISSUE: Chronic | ICD-10-CM

## 2019-06-04 LAB
ALBUMIN SERPL ELPH-MCNC: 4.5 G/DL — SIGNIFICANT CHANGE UP (ref 3.3–5)
ALP SERPL-CCNC: 88 U/L — SIGNIFICANT CHANGE UP (ref 40–120)
ALT FLD-CCNC: 44 U/L — HIGH (ref 4–41)
ANION GAP SERPL CALC-SCNC: 12 MMO/L — SIGNIFICANT CHANGE UP (ref 7–14)
APPEARANCE UR: CLEAR — SIGNIFICANT CHANGE UP
AST SERPL-CCNC: 30 U/L — SIGNIFICANT CHANGE UP (ref 4–40)
BACTERIA # UR AUTO: NEGATIVE — SIGNIFICANT CHANGE UP
BASE EXCESS BLDV CALC-SCNC: 3.7 MMOL/L — SIGNIFICANT CHANGE UP
BASOPHILS # BLD AUTO: 0.03 K/UL — SIGNIFICANT CHANGE UP (ref 0–0.2)
BASOPHILS NFR BLD AUTO: 0.3 % — SIGNIFICANT CHANGE UP (ref 0–2)
BILIRUB SERPL-MCNC: < 0.2 MG/DL — LOW (ref 0.2–1.2)
BILIRUB UR-MCNC: NEGATIVE — SIGNIFICANT CHANGE UP
BLOOD GAS VENOUS - CREATININE: 0.8 MG/DL — SIGNIFICANT CHANGE UP (ref 0.5–1.3)
BLOOD UR QL VISUAL: SIGNIFICANT CHANGE UP
BUN SERPL-MCNC: 14 MG/DL — SIGNIFICANT CHANGE UP (ref 7–23)
CALCIUM SERPL-MCNC: 9.1 MG/DL — SIGNIFICANT CHANGE UP (ref 8.4–10.5)
CARBAMAZEPINE SERPL-MCNC: < 2 UG/ML — LOW (ref 4–12)
CHLORIDE BLDV-SCNC: 106 MMOL/L — SIGNIFICANT CHANGE UP (ref 96–108)
CHLORIDE SERPL-SCNC: 101 MMOL/L — SIGNIFICANT CHANGE UP (ref 98–107)
CO2 SERPL-SCNC: 27 MMOL/L — SIGNIFICANT CHANGE UP (ref 22–31)
COLOR SPEC: YELLOW — SIGNIFICANT CHANGE UP
CREAT SERPL-MCNC: 0.9 MG/DL — SIGNIFICANT CHANGE UP (ref 0.5–1.3)
EOSINOPHIL # BLD AUTO: 0.01 K/UL — SIGNIFICANT CHANGE UP (ref 0–0.5)
EOSINOPHIL NFR BLD AUTO: 0.1 % — SIGNIFICANT CHANGE UP (ref 0–6)
GAS PNL BLDV: 138 MMOL/L — SIGNIFICANT CHANGE UP (ref 136–146)
GLUCOSE BLDV-MCNC: 91 MG/DL — SIGNIFICANT CHANGE UP (ref 70–99)
GLUCOSE SERPL-MCNC: 121 MG/DL — HIGH (ref 70–99)
GLUCOSE UR-MCNC: 70 — SIGNIFICANT CHANGE UP
HCO3 BLDV-SCNC: 25 MMOL/L — SIGNIFICANT CHANGE UP (ref 20–27)
HCT VFR BLD CALC: 44.7 % — SIGNIFICANT CHANGE UP (ref 39–50)
HCT VFR BLDV CALC: 46.4 % — SIGNIFICANT CHANGE UP (ref 39–51)
HGB BLD-MCNC: 14.4 G/DL — SIGNIFICANT CHANGE UP (ref 13–17)
HGB BLDV-MCNC: 15.1 G/DL — SIGNIFICANT CHANGE UP (ref 13–17)
HYALINE CASTS # UR AUTO: NEGATIVE — SIGNIFICANT CHANGE UP
IMM GRANULOCYTES NFR BLD AUTO: 0.4 % — SIGNIFICANT CHANGE UP (ref 0–1.5)
KETONES UR-MCNC: NEGATIVE — SIGNIFICANT CHANGE UP
LACTATE BLDV-MCNC: 1.9 MMOL/L — SIGNIFICANT CHANGE UP (ref 0.5–2)
LEUKOCYTE ESTERASE UR-ACNC: NEGATIVE — SIGNIFICANT CHANGE UP
LYMPHOCYTES # BLD AUTO: 0.64 K/UL — LOW (ref 1–3.3)
LYMPHOCYTES # BLD AUTO: 5.5 % — LOW (ref 13–44)
MAGNESIUM SERPL-MCNC: 2 MG/DL — SIGNIFICANT CHANGE UP (ref 1.6–2.6)
MCHC RBC-ENTMCNC: 27.9 PG — SIGNIFICANT CHANGE UP (ref 27–34)
MCHC RBC-ENTMCNC: 32.2 % — SIGNIFICANT CHANGE UP (ref 32–36)
MCV RBC AUTO: 86.5 FL — SIGNIFICANT CHANGE UP (ref 80–100)
MONOCYTES # BLD AUTO: 0.81 K/UL — SIGNIFICANT CHANGE UP (ref 0–0.9)
MONOCYTES NFR BLD AUTO: 6.9 % — SIGNIFICANT CHANGE UP (ref 2–14)
NEUTROPHILS # BLD AUTO: 10.18 K/UL — HIGH (ref 1.8–7.4)
NEUTROPHILS NFR BLD AUTO: 86.8 % — HIGH (ref 43–77)
NITRITE UR-MCNC: NEGATIVE — SIGNIFICANT CHANGE UP
NRBC # FLD: 0 K/UL — SIGNIFICANT CHANGE UP (ref 0–0)
PCO2 BLDV: 53 MMHG — HIGH (ref 41–51)
PH BLDV: 7.35 PH — SIGNIFICANT CHANGE UP (ref 7.32–7.43)
PH UR: 6 — SIGNIFICANT CHANGE UP (ref 5–8)
PHENYTOIN FREE SERPL-MCNC: 16.7 UG/ML — SIGNIFICANT CHANGE UP (ref 10–20)
PHOSPHATE SERPL-MCNC: 2.7 MG/DL — SIGNIFICANT CHANGE UP (ref 2.5–4.5)
PLATELET # BLD AUTO: 231 K/UL — SIGNIFICANT CHANGE UP (ref 150–400)
PMV BLD: 10.9 FL — SIGNIFICANT CHANGE UP (ref 7–13)
PO2 BLDV: 26 MMHG — LOW (ref 35–40)
POTASSIUM BLDV-SCNC: 4.1 MMOL/L — SIGNIFICANT CHANGE UP (ref 3.4–4.5)
POTASSIUM SERPL-MCNC: 3.9 MMOL/L — SIGNIFICANT CHANGE UP (ref 3.5–5.3)
POTASSIUM SERPL-SCNC: 3.9 MMOL/L — SIGNIFICANT CHANGE UP (ref 3.5–5.3)
PROT SERPL-MCNC: 7.5 G/DL — SIGNIFICANT CHANGE UP (ref 6–8.3)
PROT UR-MCNC: 100 — HIGH
RBC # BLD: 5.17 M/UL — SIGNIFICANT CHANGE UP (ref 4.2–5.8)
RBC # FLD: 13.5 % — SIGNIFICANT CHANGE UP (ref 10.3–14.5)
RBC CASTS # UR COMP ASSIST: SIGNIFICANT CHANGE UP (ref 0–?)
SAO2 % BLDV: 43.3 % — LOW (ref 60–85)
SODIUM SERPL-SCNC: 140 MMOL/L — SIGNIFICANT CHANGE UP (ref 135–145)
SP GR SPEC: 1.03 — SIGNIFICANT CHANGE UP (ref 1–1.04)
SQUAMOUS # UR AUTO: SIGNIFICANT CHANGE UP
UROBILINOGEN FLD QL: NORMAL — SIGNIFICANT CHANGE UP
WBC # BLD: 11.72 K/UL — HIGH (ref 3.8–10.5)
WBC # FLD AUTO: 11.72 K/UL — HIGH (ref 3.8–10.5)
WBC UR QL: SIGNIFICANT CHANGE UP (ref 0–?)

## 2019-06-04 PROCEDURE — 71045 X-RAY EXAM CHEST 1 VIEW: CPT | Mod: 26,76

## 2019-06-04 PROCEDURE — 99223 1ST HOSP IP/OBS HIGH 75: CPT | Mod: GC

## 2019-06-04 PROCEDURE — 74018 RADEX ABDOMEN 1 VIEW: CPT | Mod: 26

## 2019-06-04 PROCEDURE — 70250 X-RAY EXAM OF SKULL: CPT | Mod: 26

## 2019-06-04 PROCEDURE — 95819 EEG AWAKE AND ASLEEP: CPT | Mod: 26

## 2019-06-04 PROCEDURE — 93010 ELECTROCARDIOGRAM REPORT: CPT

## 2019-06-04 PROCEDURE — 70450 CT HEAD/BRAIN W/O DYE: CPT | Mod: 26

## 2019-06-04 RX ORDER — METOPROLOL TARTRATE 50 MG
1 TABLET ORAL
Qty: 0 | Refills: 0 | DISCHARGE

## 2019-06-04 RX ORDER — ENOXAPARIN SODIUM 100 MG/ML
40 INJECTION SUBCUTANEOUS DAILY
Refills: 0 | Status: DISCONTINUED | OUTPATIENT
Start: 2019-06-04 | End: 2019-06-06

## 2019-06-04 RX ORDER — FOSPHENYTOIN 50 MG/ML
1600 INJECTION INTRAMUSCULAR; INTRAVENOUS ONCE
Refills: 0 | Status: DISCONTINUED | OUTPATIENT
Start: 2019-06-04 | End: 2019-06-04

## 2019-06-04 RX ORDER — LEVETIRACETAM 250 MG/1
1000 TABLET, FILM COATED ORAL
Refills: 0 | Status: DISCONTINUED | OUTPATIENT
Start: 2019-06-04 | End: 2019-06-06

## 2019-06-04 RX ORDER — METOPROLOL TARTRATE 50 MG
25 TABLET ORAL
Refills: 0 | Status: DISCONTINUED | OUTPATIENT
Start: 2019-06-04 | End: 2019-06-04

## 2019-06-04 RX ORDER — FOSPHENYTOIN 50 MG/ML
1500 INJECTION INTRAMUSCULAR; INTRAVENOUS ONCE
Refills: 0 | Status: COMPLETED | OUTPATIENT
Start: 2019-06-04 | End: 2019-06-04

## 2019-06-04 RX ORDER — LEVETIRACETAM 250 MG/1
1000 TABLET, FILM COATED ORAL ONCE
Refills: 0 | Status: COMPLETED | OUTPATIENT
Start: 2019-06-04 | End: 2019-06-04

## 2019-06-04 RX ORDER — OXCARBAZEPINE 300 MG/1
300 TABLET, FILM COATED ORAL
Refills: 0 | Status: DISCONTINUED | OUTPATIENT
Start: 2019-06-04 | End: 2019-06-06

## 2019-06-04 RX ORDER — SODIUM CHLORIDE 9 MG/ML
1000 INJECTION INTRAMUSCULAR; INTRAVENOUS; SUBCUTANEOUS ONCE
Refills: 0 | Status: COMPLETED | OUTPATIENT
Start: 2019-06-04 | End: 2019-06-04

## 2019-06-04 RX ADMIN — LEVETIRACETAM 1000 MILLIGRAM(S): 250 TABLET, FILM COATED ORAL at 16:15

## 2019-06-04 RX ADMIN — SODIUM CHLORIDE 1000 MILLILITER(S): 9 INJECTION INTRAMUSCULAR; INTRAVENOUS; SUBCUTANEOUS at 01:58

## 2019-06-04 RX ADMIN — OXCARBAZEPINE 300 MILLIGRAM(S): 300 TABLET, FILM COATED ORAL at 23:56

## 2019-06-04 RX ADMIN — LEVETIRACETAM 1000 MILLIGRAM(S): 250 TABLET, FILM COATED ORAL at 23:56

## 2019-06-04 RX ADMIN — OXCARBAZEPINE 300 MILLIGRAM(S): 300 TABLET, FILM COATED ORAL at 16:15

## 2019-06-04 RX ADMIN — Medication 300 MILLIGRAM(S): at 11:20

## 2019-06-04 RX ADMIN — LEVETIRACETAM 1000 MILLIGRAM(S): 250 TABLET, FILM COATED ORAL at 11:20

## 2019-06-04 NOTE — H&P ADULT - NSHPREVIEWOFSYSTEMS_GEN_ALL_CORE
Gen: Denies fevers, chills  HEENT: Denies oral ulcers  CV: Denies chest pain, palpitations  Resp: Denies SOB  Abd: Denies abdominal pain, N/V, melena  Skin: Denies rashes  Ext: Denies edema  Neuro: Denies headaches, visual changes  Psych: Denies depression  Endo: Denies heat intolerance Gen: Denies fevers, chills  HEENT: Denies oral ulcers  CV: Denies chest pain, palpitations  Resp: Denies SOB, cough  Abd: Denies abdominal pain, N/V, melena  Skin: Denies rashes  Ext: Denies edema  Neuro: Denies headaches, visual changes  Psych: Denies depression or anxiety  Endo: Denies heat or cold intolerance

## 2019-06-04 NOTE — H&P ADULT - NSHPPHYSICALEXAM_GEN_ALL_CORE
Gen: NAD, Tired looking  HEENT: moist mucus membranes, no oral ulcers  CV: +S1S2 RRR no murmurs, rubs or gallops  Chest: CTA bilaterally  Abd: BS+ in all 4 quadrants, Soft, NTND  Ext: no pedal edema, R wrist radially deviated.   Neuro: AAOx3  Skin: No rashes Gen: NAD, Tired looking  Head: NC/AT  Eyes: No icterus  ENT: moist mucus membranes, no oral ulcers  CV: +S1S2 RRR no murmurs, rubs or gallops  Chest: CTA bilaterally  Abd: BS+ in all 4 quadrants, Soft, NTND  Ext: no pedal edema, R wrist radially deviated.   Neuro: AAOx3  Skin: No rashes Gen: NAD, Tired looking  Head: NC/AT  ENT: moist mucus membranes, no oral ulcers, gum hyperplasia  CV: +S1S2 RRR no murmurs, rubs or gallops  Chest: CTA bilaterally  Abd: BS+ in all 4 quadrants, Soft, NTND  Ext: no pedal edema, R wrist radially deviated.   Neuro: AAOx3  Strength symmetric 4/5 in b/l UE and LE.   Skin: No rashes

## 2019-06-04 NOTE — ED PROVIDER NOTE - ATTENDING CONTRIBUTION TO CARE
MD Rosales:  patient seen and evaluated with the resident.  I was present for key portions of the History & Physical, and I agree with the Impression & Plan.  MD Rosales:  30 yo M, c/o 4 seizures tonight.  BIB ems after the 4th Sz.  1st (10pm) was "small" only R-sided shaking.  2nd (10:20pm) was also small.  3rd (10:45pm) was tonic clonic.  $th (11pm) also tonic clonic.  Context:  +Hx Sz d/o on dilantin, keppra, oxcarbazepine; per family he is taking them.  Mhx HCP w/ shunt; has never needed a revision.  Assoc Sx: n/a - denies tongue biting, no body aches, no back pain.    VS: wnl. Physical Exam: adult M, unconscious.  neck supple, CTA B, RRR, Abd: s/nd/nt. Ext: no edema.  impression:  4x Sz in 1 hr meets definition of status epilepticus.  Although patient conversive upon arrival, given the # of Sz, he will likely need to be admitted for observation.  In meantime, he will need CT, drug levels, reassess, Neuro 2c.   Plan:  ct, shunt series, labs, reassess, neuro consult.

## 2019-06-04 NOTE — H&P ADULT - ATTENDING COMMENTS
Patient seen and examined.  Case discussed with house staff.  Agree with above as edited.   Patient is a 29yoM with h/o seizure disorder s/p  shunt admitted with 4 seizures in a short time span.   shunt series done  CT Head reviewed  AED levels pending  Neuro eval pending  24hour vEEG  Leukocytosis - non specific -suspect secondary to fever. No symptoms of infections  Plan discussed with patient and family.

## 2019-06-04 NOTE — H&P ADULT - NSHPLABSRESULTS_GEN_ALL_CORE
LABS:                          14.4   11.72 )-----------( 231      ( 2019 01:50 )             44.7       06-04    140  |  101  |  14  ----------------------------<  121<H>  3.9   |  27  |  0.90    Ca    9.1      2019 01:50  Phos  2.7     06-  Mg     2.0     06-04    TPro  7.5  /  Alb  4.5  /  TBili  < 0.2<L>  /  DBili  x   /  AST  30  /  ALT  44<H>  /  AlkPhos  88  06-04       LIVER FUNCTIONS - ( 2019 01:50 )  Alb: 4.5 g/dL / Pro: 7.5 g/dL / ALK PHOS: 88 u/L / ALT: 44 u/L / AST: 30 u/L / GGT: x                    Urinalysis Basic - ( 2019 05:30 )    Color: YELLOW / Appearance: CLEAR / S.027 / pH: 6.0  Gluc: 70 / Ketone: NEGATIVE  / Bili: NEGATIVE / Urobili: NORMAL   Blood: SMALL / Protein: 100 / Nitrite: NEGATIVE   Leuk Esterase: NEGATIVE / RBC: 0-2 / WBC 3-5   Sq Epi: OCC / Non Sq Epi: x / Bacteria: NEGATIVE            Lactate Trend    Blood Gas Venous Comprehensive (19 @ 09:05)    Blood Gas Venous - Lactate: 1.9: Please note updated reference range. mmol/L    Blood Gas Venous - Chloride: 106 mmol/L    Blood Gas Venous - Creatinine: 0.80 mg/dL    pH, Venous: 7.35 pH    pCO2, Venous: 53 mmHg    pO2, Venous: 26 mmHg    HCO3, Venous: 25 mmol/L    Base Excess, Venous: 3.7: REFERENCE RANGE = -3 + 2 mmol/L mmol/L    Oxygen Saturation, Venous: 43.3 %    Blood Gas Venous - Sodium: 138 mmol/L    Blood Gas Venous - Potassium: 4.1 mmol/L    Blood Gas Venous - Glucose: 91 mg/dL    Blood Gas Venous - Hemoglobin: 15.1 g/dL    Blood Gas Venous - Hematocrit: 46.4 %            CAPILLARY BLOOD GLUCOSE      POCT Blood Glucose.: 178 mg/dL (2019 00:07)            RADIOLOGY & ADDITIONAL TESTS: Reviewed    < from: CT Head No Cont (19 @ 05:31) >    IMPRESSION:     No acute intracranial hemorrhage or mass effect.    Stable left parieto-occipital ventriculostomy catheter and size and   configuration of the ventricular system since 2018.        < end of copied text >    < from: Xray Shunt Series (19 @ 02:50) >      IMPRESSION:    shunt identified originating from the left lateral ventricle and   ending in the mid abdomen without kinks or discontinuity.        < end of copied text >

## 2019-06-04 NOTE — ED PROVIDER NOTE - OBJECTIVE STATEMENT
28 yo male presenting with "3 large seizures and 1 small seizure" that happened today.  large seizures manifest with body rooling and full body shaking on the right side.  each large seizure lasted 3 minutes.  Small seizure manifests as less shaking and lasted less than one minute.  Per family, patient not complaining of anything prior to seizures.  last seizure in march and february before that.  compliant with medications.

## 2019-06-04 NOTE — CONSULT NOTE ADULT - ATTENDING COMMENTS
Patient seen and examined with neurology team and above note reviewed and I agree with assessment and plan as outlined. Patient with long history of seizures since childhood and presenting with breakthru seizure. He is at baseline functioning and no new neurologic deficits appreciated.  No underlying lab abnormality or infection ascertained. Awaiting levels of trileptal and dilantin level was 16.   Overnight EEG showed rare left frontal region spikes and slowing but no seizures.  Would increase daily dilantin to 300mg daily and continue keppra 1000mg TID and await trileptal levels and if low will increase to 600mg BID.  Continue seizure precautions and medical mgt and supportive care and he is to follow up with his private neurologist in 1 week.

## 2019-06-04 NOTE — H&P ADULT - PROBLEM SELECTOR PLAN 1
Patient with history of seizures, on 3 antiepileptics  - neuro consulted, verbal recs appreciated, no need for IV meds for now.  - will f/u official note  - restart home antiepileptics, including keppra 1000 TID, Dilantin 300 Qd, oxcarbazapine 300 TID. Patient with history of seizures, on 3 antiepileptics  - neuro consulted, verbal recs appreciated, no need for IV meds for now.  - will f/u official note  - restart home antiepileptics, including keppra 1000 TID, Dilantin 300 Qd, oxcarbazapine 300 TID.  - video EEG  - will monitor for seizure activity  - frequent neurochecks   - back to baseline as per patient and brother.   - currently symmetric strength

## 2019-06-04 NOTE — CONSULT NOTE ADULT - SUBJECTIVE AND OBJECTIVE BOX
Neurology  Consult Note  06-04-19    Name:  JULITO COLLINS; 29y (1990)    Reason for Admission: Convulsions      Chief Complaint:  HPI:  28yom w/ developmental delay,  shunt and seizures coming in with seizures. Pt was at home yesterday, felt some tingling and weakness on his R arm, also had some twitching, for less than 1 minute. He felt that a seizure was coming on, so he took his PM medications. Then he had 2 episodes of "big seizures" where his whole body was convulsing, and he lost consciousness. This lasted about 6 minutes total, about 3 minutes, then another 3 minutes. His brother witnessed the seizures and brought him into the ED. Patient reports not missing any seizure medications. He was seen by his neurologist on wednesday of last week, had blood work, and his levels were therapeutic as per patient.  He had urinary incontinence and post ictal phase during the seizure. No tongue biting, no vomiting, no headaches, visual changes. No fevers or chills recently, no CP.  Had a brief episode of SOB on sunday that lasted about 2 minutes.  In ED, patient was afebrile, 98.3F, 146HR, 159/92, and 16RR 100% on RA (04 Jun 2019 07:52)    Neurology consulted for seizure management. Patient reports last seizure in March 2019. Seizure frequency ~2-4/yr Describes RUE focal motor that generalizes with LOC. Patient reports no sustained injuries.  Patient currently with brother. States that he is back to baseline. Currently denies fevers, chills, ns, cp ,sob, abd pain ,n/v/d/c, weakness, or changes to weight or senses.     Review of Systems:  As states in HPI.    PMHx:   Developmental delay  Seizure    PFHx:   No pertinent family history in first degree relatives    PSuHx:   H/O contracture of joint  No significant past surgical history    Medications:  MEDICATIONS  (STANDING):  enoxaparin Injectable 40 milliGRAM(s) SubCutaneous daily  levETIRAcetam 1000 milliGRAM(s) Oral <User Schedule>  OXcarbazepine 300 milliGRAM(s) Oral <User Schedule>  phenytoin   Capsule 300 milliGRAM(s) Oral daily    MEDICATIONS  (PRN):    Vitals:  T(C): 37.2 (06-04-19 @ 13:56), Max: 37.7 (06-04-19 @ 00:57)  HR: 100 (06-04-19 @ 13:56) (97 - 146)  BP: 127/86 (06-04-19 @ 13:56) (102/56 - 159/92)  RR: 17 (06-04-19 @ 13:56) (15 - 17)  SpO2: 99% (06-04-19 @ 13:56) (97% - 100%)    Labs:                        14.4   11.72 )-----------( 231      ( 04 Jun 2019 01:50 )             44.7     06-04    140  |  101  |  14  ----------------------------<  121<H>  3.9   |  27  |  0.90    Ca    9.1      04 Jun 2019 01:50  Phos  2.7     06-04  Mg     2.0     06-04    TPro  7.5  /  Alb  4.5  /  TBili  < 0.2<L>  /  DBili  x   /  AST  30  /  ALT  44<H>  /  AlkPhos  88  06-04    CAPILLARY BLOOD GLUCOSE  POCT Blood Glucose.: 178 mg/dL (04 Jun 2019 00:07)    LIVER FUNCTIONS - ( 04 Jun 2019 01:50 )  Alb: 4.5 g/dL / Pro: 7.5 g/dL / ALK PHOS: 88 u/L / ALT: 44 u/L / AST: 30 u/L / GGT: x             PHYSICAL EXAMINATION:  General: Well-developed, well nourished, in no acute distress.  Eyes: Conjunctiva and sclera clear.  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 Decreased bulk ot RUE/RLE  Neurologic:  - Mental Status:  Alert, awake, oriented to person, place, and time; Speech is fluent with intact naming, repetition, and comprehension; Good overall fund of knowledge;   - Cranial Nerves II-XII:    II:  Visual fields are full to confrontation; Pupils are equal, round, and reactive to light  III, IV, VI:  Extraocular movements are intact without nystagmus.  V:  Facial sensation is intact in the V1-V3 distribution bilaterally.  VII:  Face is symmetric with normal eye closure and smile  VIII:  Hearing is intact to finger rub.  IX, X:  Uvula is midline and soft palate rises symmetrically  XI:  Head turning and shoulder shrug are intact.  XII:  Tongue protudes in the midline.  - Motor:  Strength is 5/5 LUE/LLE, 4+/5 RUE/RLE.  - Reflexes:  2+ and symmetric at the biceps, triceps, brachioradialis, knees, and ankles.  Plantar responses flexor.  - Sensory:  Intact throughout to vibration, and joint-position, light touch, pin prick.  - Coordination:  Finger-nose-finger and heel-knee-shin intact without dysmetria.  Rapid alternating hand movements intact.    Radiology:  < from: CT Head No Cont (06.04.19 @ 05:31) >  IMPRESSION:     No acute intracranial hemorrhage or mass effect.    Stable left parieto-occipital ventriculostomy catheter and size and   configuration of the ventricular system since 12/7/2018.    MICHELE DANIEL M.D., ATTENDING RADIOLOGIST  This document has been electronically signed. Jun 4 2019  5:34AM    < end of copied text >

## 2019-06-04 NOTE — H&P ADULT - HISTORY OF PRESENT ILLNESS
28yom w/ developmental delay,  shunt and seizures coming in with seizures. 28yom w/ developmental delay,  shunt and seizures coming in with seizures. Pt was at home yesterday, felt some tingling and weakness on his R arm, also had some twitching, for less than 1 minute. He felt that a seizure was coming on, so he took his PM medications. Then he had 2 episodes of "big seizures" where his whole body was convulsing, and he lost consciousness. This lasted about 6 minutes total, about 3 minutes, then another 3 minutes. His brother witnessed the seizures and brought him into the ED. Patient reports not missing any seizure medications. He was seen by his neurologist on wednesday of last week, had blood work, and his levels were therapeutic as per patient.  He had urinary incontinence and post ictal phase during the seizure. No tongue biting, no vomiting, no headaches, visual changes. No fevers or chills recently, no CP.  Had a brief episode of SOB on sunday that lasted about 2 minutes.    In ED, patient was afebrile, 98.3F, 146HR, 159/92, and 16RR 100% on RA

## 2019-06-04 NOTE — EEG REPORT - NS EEG TEXT BOX
[facility]   COMPREHENSIVE EPILEPSY CENTER   REPORT OF ROUTINE VIDEO EEG     [facility]: [address], [place] [address], Ph#: [phone]  [facility]: [address], [place] [address], Ph#: [phone]  Office: [address] [place] [address], [place] [address], [place] [address] Ph#: [phone]    Patient Name: JULITO COLLINS  Age and : 29y ([birth year])  MRN #: [id number]  Location: [place] [address] [address]  Referring Physician: Gerardo Mckeon    Study Date: 19  _____________________________________________________________  TECHNICAL INFORMATION    Placement and Labeling of Electrodes:  The EEG was performed utilizing 20 channels referential EEG connections (coronal over temporal over parasagittal montage) using all standard 10-20 electrode placements with EKG.  Recording was at a sampling rate of 256 samples per second per channel.  Time synchronized digital video recording was done simultaneously with EEG recording.  A low light infrared camera was used for low light recording.  Ramón and seizure detection algorithms were utilized.    _____________________________________________________________  HISTORY    Patient is a 29y old  Male who presents with a chief complaint of Seizures (2019 07:52)    PERTINENT MEDICATION:  levETIRAcetam 1000 milliGRAM(s) Oral <User Schedule>  OXcarbazepine 300 milliGRAM(s) Oral <User Schedule>  phenytoin   Capsule 300 milliGRAM(s) Oral daily  _____________________________________________________________  STUDY INTERPRETATION    Findings: The background was continuous, spontaneously variable and reactive. During wakefulness, the posterior dominant rhythm consisted of asymmetric 9 Hz activity over the right hemisphere and 6-7Hz over the left hemisphere.    Focal Slowing:   Continuos polymorphic delta and theta activities over the left hemisphere max frontotemporal region.     Sleep Background:  Drowsiness was characterized by fragmentation, attenuation, and slowing of the background activity.    Sleep was characterized by the presence of symmetric spindles, and K-complexes.    Other Non-Epileptiform Findings:  None were present.    Interictal Epileptiform Activity:   Occasional to frequent left frontotemporal spike wave discharges Max F7>Fp1.    Events:  Clinical events: None recorded.  Seizures: None recorded.    Activation Procedures:   Hyperventilation was not performed.    Photic stimulation was performed and did not elicit any abnormality.     Artifacts:  Intermittent myogenic and movement artifacts were noted.    ECG:  The heart rate on single channel ECG was predominantly between 70-80 BPM.  _____________________________________________________________  EEG SUMMARY/CLASSIFICATION    Abnormal EEG in the awake, drowsy and asleep states.  - Occasional to frequent left frontotemporal spike wave discharges Max F7>Fp1.  - Continuos polymorphic delta and theta activities over the left hemisphere max frontotemporal region.   _____________________________________________________________  EEG IMPRESSION/CLINICAL CORRELATE    Abnormal EEG study.  1. Potential epileptogenic focus in the left frontotemporal region.   2. Structural abnormality in the left hemisphere.   3. No seizure seen.        Preliminary Fellow Read:      Tammi Marcos MD  Adult EEG Fellow, [facility]   COMPREHENSIVE EPILEPSY CENTER   REPORT OF ROUTINE VIDEO EEG     [facility]: [address], [place] [address], Ph#: [phone]  [facility]: [address], [place] [address], Ph#: [phone]  Office: [address] [place] [address], [place] [address], [place] [address] Ph#: [phone]    Patient Name: JULITO COLLINS  Age and : 29y ([birth year])  MRN #: [id number]  Location: [place] [address] [address]  Referring Physician: Gerardo Mckeon    Study Date: 19  _____________________________________________________________  TECHNICAL INFORMATION    Placement and Labeling of Electrodes:  The EEG was performed utilizing 20 channels referential EEG connections (coronal over temporal over parasagittal montage) using all standard 10-20 electrode placements with EKG.  Recording was at a sampling rate of 256 samples per second per channel.  Time synchronized digital video recording was done simultaneously with EEG recording.  A low light infrared camera was used for low light recording.  Ramón and seizure detection algorithms were utilized.    _____________________________________________________________  HISTORY    Patient is a 29y old  Male who presents with a chief complaint of Seizures (2019 07:52)    PERTINENT MEDICATION:  levETIRAcetam 1000 milliGRAM(s) Oral <User Schedule>  OXcarbazepine 300 milliGRAM(s) Oral <User Schedule>  phenytoin   Capsule 300 milliGRAM(s) Oral daily  _____________________________________________________________  STUDY INTERPRETATION    Findings: The background was continuous, spontaneously variable and reactive. During wakefulness, the posterior dominant rhythm consisted of asymmetric 9 Hz activity over the right hemisphere and 6-7Hz over the left hemisphere.    Focal Slowing:   Continuous polymorphic delta and theta activities over the left hemisphere max frontotemporal region.     Sleep Background:  Drowsiness was characterized by fragmentation, attenuation, and slowing of the background activity.    Sleep was characterized by the presence of symmetric spindles, and K-complexes.    Other Non-Epileptiform Findings:  None were present.    Interictal Epileptiform Activity:   Frequent left frontal spike wave discharges Max F7>Fp1.    Events:  Clinical events: None recorded.  Seizures: None recorded.    Activation Procedures:   Hyperventilation was not performed.    Photic stimulation was performed and did not elicit any abnormality.     Artifacts:  Intermittent myogenic and movement artifacts were noted.    ECG:  The heart rate on single channel ECG was predominantly between 70-80 BPM.  _____________________________________________________________  EEG SUMMARY/CLASSIFICATION    Abnormal EEG in the awake, drowsy and asleep states.  - Frequent left frontal spike wave discharges Max F7>Fp1.  - Continuos polymorphic delta and theta activities over the left hemisphere max frontotemporal region.   _____________________________________________________________  EEG IMPRESSION/CLINICAL CORRELATE    Abnormal EEG study.  1. Potential epileptogenic focus in the left frontal region.   2. Structural abnormality in the left hemisphere.   3. No seizures recorded.    Tammi Marcos MD  Adult EEG Fellow, [facility]    Guido Husain MD  EEG / Epilepsy Attending Physician

## 2019-06-04 NOTE — H&P ADULT - PROBLEM SELECTOR PLAN 2
Pt w history of hypertension  - states it's from his antiseizure medications  - patient was hypertensive on admission, but likely secondary to seizures  - now normotensive  - will monitor off his home med, metoprolol 25 BID for now.

## 2019-06-04 NOTE — H&P ADULT - ASSESSMENT
28 yo M with PMH of developmental delay and seizures w  shunt coming in with seizures. Undergoing workup with drug levels and video EEG

## 2019-06-04 NOTE — ED PROVIDER NOTE - PHYSICAL EXAMINATION
gen: well appearing  Mentation: unable to assess   ENT: airway patent  Eyes: conjunctivae clear bilaterally  Cardio: tachycardic, no m/r/g  Resp: normal BS b/l  GI: s/nt/nd   Neuro: moving all four extremities, sleepy  Skin: No evidence of rash

## 2019-06-04 NOTE — ED ADULT NURSE NOTE - NSIMPLEMENTINTERV_GEN_ALL_ED
Implemented All Universal Safety Interventions:  [place] to call system. Call bell, personal items and telephone within reach. Instruct patient to call for assistance. Room bathroom lighting operational. Non-slip footwear when patient is off stretcher. Physically safe environment: no spills, clutter or unnecessary equipment. Stretcher in lowest position, wheels locked, appropriate side rails in place.

## 2019-06-04 NOTE — ED PROVIDER NOTE - CLINICAL SUMMARY MEDICAL DECISION MAKING FREE TEXT BOX
28 yo male presenting with seizures; rule out infection vs problem with  shunt; labs urine cxr seizure med levels ct re eval impression:  4x Sz in 1 hr meets definition of status epilepticus.  Although patient conversive upon arrival, given the # of Sz, he will likely need to be admitted for observation.  In meantime, he will need CT, drug levels, reassess, Neuro 2c.   Plan:  ct, shunt series, labs, reassess, neuro consult.

## 2019-06-04 NOTE — CONSULT NOTE ADULT - ASSESSMENT
Neurology consulted for seizure management. Patient reports last seizure in March 2019. Seizure frequency ~2-4/yr Describes RUE focal motor that generalizes with LOC. Patient reports no sustained injuries.  Patient currently with brother. States that he is back to baseline. Currently denies fevers, chills, ns, cp ,sob, abd pain ,n/v/d/c, weakness, or changes to weight or senses.     Impression:    Breakthrough seizures.     Plan:  - vEEG  - Repeat PHT and OXC levels in morning.   - Continue home medications.   - f/u with outpatient neuro in 1-2 weeks.

## 2019-06-04 NOTE — H&P ADULT - NSHPSOCIALHISTORY_GEN_ALL_CORE
Lives with brother, has padded bed, no cigs, no alcohol, no drugs. Lives with brother, has padded bed, no tobacco, no alcohol, no drugs.

## 2019-06-04 NOTE — ED ADULT NURSE NOTE - OBJECTIVE STATEMENT
29 y.o male a&o x3, p/w c/o seizure activity. Pt endorses 4 episodes of seizure activity prior to coming to the ED, first seizure started at 9:45PM. Pt endorses headache. Denies dizziness, fever, chills, nausea and vomiting. PMH of HTN.  Breath sounds clear bilaterally, respirations even and unlabored. Radial pulses strong bilaterally. Pt has a  shunt in head which was placed shortly after birth. 29 y.o male a&o x3, p/w c/o seizure activity. Pt endorses 4 episodes of seizure activity prior to coming to the ED, first seizure started at 9:45PM. Pt endorses headache. Pt's gums are swollen. Denies dizziness, fever, chills, nausea and vomiting. PMH of HTN.  Breath sounds clear bilaterally, respirations even and unlabored. Radial pulses strong bilaterally. Pt has a  shunt in head which was placed shortly after birth.

## 2019-06-05 ENCOUNTER — TRANSCRIPTION ENCOUNTER (OUTPATIENT)
Age: 29
End: 2019-06-05

## 2019-06-05 LAB — LEVETIRACETAM SERPL-MCNC: 33.1 MCG/ML — SIGNIFICANT CHANGE UP (ref 12–46)

## 2019-06-05 PROCEDURE — 95951: CPT | Mod: 26

## 2019-06-05 PROCEDURE — 99232 SBSQ HOSP IP/OBS MODERATE 35: CPT | Mod: GC

## 2019-06-05 PROCEDURE — 99222 1ST HOSP IP/OBS MODERATE 55: CPT | Mod: GC

## 2019-06-05 RX ADMIN — LEVETIRACETAM 1000 MILLIGRAM(S): 250 TABLET, FILM COATED ORAL at 23:43

## 2019-06-05 RX ADMIN — LEVETIRACETAM 1000 MILLIGRAM(S): 250 TABLET, FILM COATED ORAL at 15:04

## 2019-06-05 RX ADMIN — OXCARBAZEPINE 300 MILLIGRAM(S): 300 TABLET, FILM COATED ORAL at 06:24

## 2019-06-05 RX ADMIN — OXCARBAZEPINE 300 MILLIGRAM(S): 300 TABLET, FILM COATED ORAL at 15:04

## 2019-06-05 RX ADMIN — LEVETIRACETAM 1000 MILLIGRAM(S): 250 TABLET, FILM COATED ORAL at 06:24

## 2019-06-05 RX ADMIN — Medication 300 MILLIGRAM(S): at 14:21

## 2019-06-05 RX ADMIN — OXCARBAZEPINE 300 MILLIGRAM(S): 300 TABLET, FILM COATED ORAL at 23:43

## 2019-06-05 NOTE — DISCHARGE NOTE PROVIDER - PROVIDER TOKENS
FREE:[LAST:[Vasyl],FIRST:[Adelfo],PHONE:[phone]],FAX:[(   )    -]],FREE:[LAST:[jelena],FIRST:[maikol],PHONE:[phone]],FAX:[(   )    -]]

## 2019-06-05 NOTE — PROGRESS NOTE ADULT - ATTENDING COMMENTS
Patient seen and examined.  Case discussed with house staff.  Agree with above as edited.   Patient is a 29yoM with h/o seizure disorder s/p  shunt admitted with 4 seizures in a short time span.   shunt series done  CT Head and EEG reviewed  AED levels pending  Neuro eval appreciated  Plan discussed with patient and family.  Dispo planning - home services being reinstated.  Case d/w Case management, social work and nursing on IDRs

## 2019-06-05 NOTE — DISCHARGE NOTE PROVIDER - CARE PROVIDER_API CALL
Adelfo Fallon  Phone: [phone]  Fax: (   )    -  Follow Up Time:     maikol bowles  Phone: [phone]  Fax: (   )    -  Follow Up Time:

## 2019-06-05 NOTE — PROGRESS NOTE ADULT - ASSESSMENT
30 yo M with PMH of developmental delay and seizures w  shunt coming in with seizures. Undergoing workup with drug levels and video EEG 28 yo M with PMH of developmental delay and seizures w  shunt coming in with seizures.

## 2019-06-05 NOTE — PROGRESS NOTE ADULT - SUBJECTIVE AND OBJECTIVE BOX
Ella Suyeon Yu PGY-1   [facility] Pager # [phone]  NS Pager # [phone]      Patient is a 29y old  Male who presents with a chief complaint of Seizures (2019 14:47)      SUBJECTIVE: No acute events overnight. Patient seen and examined at bedside.    REVIEW OF SYSTEMS:  CONSTITUTIONAL: No fever, weight loss, or fatigue  RESPIRATORY: No cough or shortness of breath  CARDIOVASCULAR: No chest pain, palpitations, dizziness, or leg swelling  GASTROINTESTINAL: No abdominal or epigastric pain. No nausea, vomiting, or hematemesis; No diarrhea or constipation. No melena or hematochezia.  GENITOURINARY: No dysuria  NEUROLOGICAL: No headaches  SKIN: No itching or lesions       MEDICATIONS  (STANDING):  enoxaparin Injectable 40 milliGRAM(s) SubCutaneous daily  levETIRAcetam 1000 milliGRAM(s) Oral <User Schedule>  OXcarbazepine 300 milliGRAM(s) Oral <User Schedule>  phenytoin   Capsule 300 milliGRAM(s) Oral daily    MEDICATIONS  (PRN):        Objective:    Vitals: Vital Signs Last 24 Hrs  T(C): 36.9 (19 @ 21:18), Max: 37.2 (19 @ 13:56)  T(F): 98.4 (19 @ 21:18), Max: 98.9 (19 @ 13:56)  HR: 100 (19 @ 21:18) (99 - 100)  BP: 121/74 (19 @ 21:18) (102/56 - 127/86)  BP(mean): --  RR: 17 (19 @ 21:18) (16 - 17)  SpO2: 98% (19 @ 21:18) (98% - 99%)            I&O's Summary      PHYSICAL EXAM:  GENERAL: NAD  HEAD:  Atraumatic, Normocephalic  CHEST/LUNG: Clear to auscultation bilaterally; No rales or wheezing  HEART: Regular rate and rhythm; No murmurs, rubs, or gallops  ABDOMEN: Soft, nontender, nondistended  EXTREMITIES:  No clubbing, cyanosis, or edema  LYMPH: No lymphadenopathy noted  SKIN: No rashes or lesions  NERVOUS SYSTEM:  Alert & Oriented X3    LABS:      140  |  101  |  14  ----------------------------<  121<H>  3.9   |  27  |  0.90    Ca    9.1      2019 01:50  Phos  2.7     06-  Mg     2.0     06-    TPro  7.5  /  Alb  4.5  /  TBili  < 0.2<L>  /  DBili  x   /  AST  30  /  ALT  44<H>  /  AlkPhos  88  06-04                  Urinalysis Basic - ( 2019 05:30 )    Color: YELLOW / Appearance: CLEAR / S.027 / pH: 6.0  Gluc: 70 / Ketone: NEGATIVE  / Bili: NEGATIVE / Urobili: NORMAL   Blood: SMALL / Protein: 100 / Nitrite: NEGATIVE   Leuk Esterase: NEGATIVE / RBC: 0-2 / WBC 3-5   Sq Epi: OCC / Non Sq Epi: x / Bacteria: NEGATIVE                              14.4   11.72 )-----------( 231      ( 2019 01:50 )             44.7     CAPILLARY BLOOD GLUCOSE        RADIOLOGY:  Imaging Personally Reviewed: YES      Consultants involved in case: YES  Consultant(s) Notes Reviewed:  YES  Care Discussed with Consultants/Other Providers     Ella Suyeon Yu PGY-1 Ella Suyeon Yu PGY-1   [facility] Pager # [phone]  NS Pager # [phone]      Patient is a 29y old  Male who presents with a chief complaint of Seizures (2019 14:47)      SUBJECTIVE: No acute events overnight. Patient seen and examined at bedside. No events, no seizures, feels back to baseline, less lethargic    REVIEW OF SYSTEMS:  CONSTITUTIONAL: No fever, weight loss, or fatigue  RESPIRATORY: No cough or shortness of breath  CARDIOVASCULAR: No chest pain, palpitations, dizziness, or leg swelling  GASTROINTESTINAL: No abdominal or epigastric pain. No nausea, vomiting, or hematemesis; No diarrhea or constipation. No melena or hematochezia.  GENITOURINARY: No dysuria  NEUROLOGICAL: No headaches  SKIN: No itching or lesions       MEDICATIONS  (STANDING):  enoxaparin Injectable 40 milliGRAM(s) SubCutaneous daily  levETIRAcetam 1000 milliGRAM(s) Oral <User Schedule>  OXcarbazepine 300 milliGRAM(s) Oral <User Schedule>  phenytoin   Capsule 300 milliGRAM(s) Oral daily    MEDICATIONS  (PRN):        Objective:    Vitals: Vital Signs Last 24 Hrs  T(C): 36.9 (19 @ 21:18), Max: 37.2 (19 @ 13:56)  T(F): 98.4 (19 @ 21:18), Max: 98.9 (19 @ 13:56)  HR: 100 (19 @ 21:18) (99 - 100)  BP: 121/74 (19 @ 21:18) (102/56 - 127/86)  BP(mean): --  RR: 17 (19 @ 21:18) (16 - 17)  SpO2: 98% (19 @ 21:18) (98% - 99%)            I&O's Summary      PHYSICAL EXAM:  GENERAL: NAD  CHEST/LUNG: Clear to auscultation bilaterally; No rales or wheezing  HEART: Regular rate and rhythm; No murmurs, rubs, or gallops  ABDOMEN: Soft, nontender, nondistended  EXTREMITIES:  No clubbing, cyanosis, or edema, contracture in R wrist.   SKIN: No rashes or lesions  NERVOUS SYSTEM:  Alert & Oriented X3  Strength symmetric b/l 4/5    LABS:  -    140  |  101  |  14  ----------------------------<  121<H>  3.9   |  27  |  0.90    Ca    9.1      2019 01:50  Phos  2.7     06-  Mg     2.0     06-04    TPro  7.5  /  Alb  4.5  /  TBili  < 0.2<L>  /  DBili  x   /  AST  30  /  ALT  44<H>  /  AlkPhos  88  06-04                  Urinalysis Basic - ( 2019 05:30 )    Color: YELLOW / Appearance: CLEAR / S.027 / pH: 6.0  Gluc: 70 / Ketone: NEGATIVE  / Bili: NEGATIVE / Urobili: NORMAL   Blood: SMALL / Protein: 100 / Nitrite: NEGATIVE   Leuk Esterase: NEGATIVE / RBC: 0-2 / WBC 3-5   Sq Epi: OCC / Non Sq Epi: x / Bacteria: NEGATIVE                              14.4   11.72 )-----------( 231      ( 2019 01:50 )             44.7     CAPILLARY BLOOD GLUCOSE        RADIOLOGY:  Imaging Personally Reviewed: YES      Consultants involved in case: YES  Consultant(s) Notes Reviewed:  YES  Care Discussed with Consultants/Other Providers     Ella Suyeon Yu PGY-1

## 2019-06-05 NOTE — DISCHARGE NOTE PROVIDER - HOSPITAL COURSE
28yom w/ developmental delay,  shunt and seizures coming in with seizures. Pt was at home yesterday, felt some tingling and weakness on his R arm, which went into tonic clonic seizures. Patient arrived in ED and had one more episode of a tonic clonic seizure, and broke on its own. Patient was tachycardic and hypertensive in the ED. He got a video EEG and neurology consult. We restarted his home antiepileptics and went on his dilantin to 300 Qd. Patient's video EEG was (-) for seizures and his serum antiepileptic levels were therapeutic, he was discharged with instructions to follow up with his neurologist within 1 week. 28yom w/ developmental delay,  shunt and seizures coming in with seizures. Pt was at home yesterday, felt some tingling and weakness on his R arm, which went into tonic clonic seizures. Patient arrived in ED and had one more episode of a tonic clonic seizure, and broke on its own. Patient was tachycardic and hypertensive in the ED. He got a video EEG and neurology consult. We restarted his home antiepileptics and went on his dilantin to 300 Qd. Patient's video EEG was (-) for seizures and his serum antiepileptic levels were (keppra and phenytoin) therapeutic, Oxcarbazepene level was not back when he was discharged, will f/u and let neurology know about the levels. If low, they will go up on oxcarbazepene level. He was discharged with instructions to follow up with his neurologist within 1 week.

## 2019-06-05 NOTE — EEG REPORT - NS EEG TEXT BOX
[facility]   COMPREHENSIVE EPILEPSY CENTER   REPORT OF CONTINUOUS VIDEO EEG     [facility]: [address], [place] [address], Ph#: [phone]  [facility]:  [address] [place] [address], Ph#: [phone]  Office: [address] [place] [address], [place] [address], [place] [address] Ph#: [phone]    Patient Name: JULITO COLLINS  Age and : 29y ([birth year])  MRN #: [id number]  Location: [place] [address] [address]  Referring Physician: Gerardo Mckeon    Study Date: 06    _____________________________________________________________  STUDY INFORMATION    EEG Recording Technique:  The patient underwent continuous Video-EEG monitoring, using Telemetry System hardware on the XLTek Digital System. EEG and video data were stored on a computer hard drive with important events saved in digital archive files. The material was reviewed by a physician (electroencephalographer / epileptologist) on a daily basis. Ramón and seizure detection algorithms were utilized and reviewed. An EEG Technician attended to the patient, and was available throughout daytime work hours.  The epilepsy center neurologist was available in person or on call 24-hours per day.    EEG Placement and Labeling of Electrodes:  The EEG was performed utilizing 20 channel referential EEG connections (coronal over temporal over parasagittal montage) using all standard 10-20 electrode placements with EKG, with additional electrodes placed in the inferior temporal region using the modified 10-10 montage electrode placements for elective admissions, or if deemed necessary. Recording was at a sampling rate of 256 samples per second per channel. Time synchronized digital video recording was done simultaneously with EEG recording. A low light infrared camera was used for low light recording.     _____________________________________________________________  HISTORY    Patient is a 29y old  Male who presents with a chief complaint of Seizures (2019 07:26)    PERTINENT MEDICATION:  levETIRAcetam 1000 milliGRAM(s) Oral <User Schedule>  OXcarbazepine 300 milliGRAM(s) Oral <User Schedule>  phenytoin   Capsule 300 milliGRAM(s) Oral daily    _____________________________________________________________  STUDY INTERPRETATION    Findings: The background was continuous, spontaneously variable and reactive. During wakefulness, the posterior dominant rhythm consisted of asymmetric 9 Hz activity over the right hemisphere and 6-7Hz over the left hemisphere.    Focal Slowing:   Continuous polymorphic delta and theta activities over the left hemisphere max frontotemporal region.     Sleep Background:  Drowsiness was characterized by fragmentation, attenuation, and slowing of the background activity.    Sleep was characterized by the presence of symmetric spindles, and K-complexes.    Other Non-Epileptiform Findings:  None were present.    Interictal Epileptiform Activity:   Rare to occasional left frontal spike wave discharges Max F7>Fp1.    Events:  Clinical events: None recorded.  Seizures: None recorded.    Activation Procedures:   Hyperventilation was not performed.    Photic stimulation was performed and did not elicit any abnormality.     Artifacts:  Intermittent myogenic and movement artifacts were noted.    ECG:  The heart rate on single channel ECG was predominantly between 70-80 BPM.  _____________________________________________________________  EEG SUMMARY/CLASSIFICATION    Abnormal EEG in the awake, drowsy and asleep states.  - Rare to occasional  left frontal spike wave discharges Max F7>Fp1.  - Continuos polymorphic delta and theta activities over the left hemisphere max frontotemporal region.   _____________________________________________________________  EEG IMPRESSION/CLINICAL CORRELATE    Abnormal EEG study.  1. Potential epileptogenic focus in the left frontal region.   2. Structural abnormality in the left hemisphere.   3. No seizures recorded.      Tammi Marcos MD  Adult EEG Fellow, [facility] Epilepsy [facility]   COMPREHENSIVE EPILEPSY CENTER   REPORT OF CONTINUOUS VIDEO EEG     [facility]: [address], [place] [address], Ph#: [phone]  [facility]:  [address] [place] [address], Ph#: [phone]  Office: [address] [place] [address], [place] [address], [place] [address] Ph#: [phone]    Patient Name: JULITO COLLINS  Age and : 29y (86)  MRN #: [id number]  Location: [place] [address] A  Referring Physician: Gerardo Mckeon    Study Date: 06    _____________________________________________________________  STUDY INFORMATION    EEG Recording Technique:  The patient underwent continuous Video-EEG monitoring, using Telemetry System hardware on the XLTek Digital System. EEG and video data were stored on a computer hard drive with important events saved in digital archive files. The material was reviewed by a physician (electroencephalographer / epileptologist) on a daily basis. Ramón and seizure detection algorithms were utilized and reviewed. An EEG Technician attended to the patient, and was available throughout daytime work hours.  The epilepsy center neurologist was available in person or on call 24-hours per day.    EEG Placement and Labeling of Electrodes:  The EEG was performed utilizing 20 channel referential EEG connections (coronal over temporal over parasagittal montage) using all standard 10-20 electrode placements with EKG, with additional electrodes placed in the inferior temporal region using the modified 10-10 montage electrode placements for elective admissions, or if deemed necessary. Recording was at a sampling rate of 256 samples per second per channel. Time synchronized digital video recording was done simultaneously with EEG recording. A low light infrared camera was used for low light recording.     _____________________________________________________________  HISTORY    Patient is a 29y old  Male who presents with a chief complaint of Seizures (2019 07:26)    PERTINENT MEDICATION:  levETIRAcetam 1000 milliGRAM(s) Oral <User Schedule>  OXcarbazepine 300 milliGRAM(s) Oral <User Schedule>  phenytoin   Capsule 300 milliGRAM(s) Oral daily    _____________________________________________________________  STUDY INTERPRETATION    Findings: The background was continuous, spontaneously variable and reactive. During wakefulness, the posterior dominant rhythm consisted of asymmetric 9 Hz activity over the right hemisphere and 6-7Hz over the left hemisphere.    Focal Slowing:   Continuous polymorphic delta and theta activities over the left hemisphere max frontotemporal region.     Sleep Background:  Drowsiness was characterized by fragmentation, attenuation, and slowing of the background activity.    Sleep was characterized by the presence of symmetric spindles, and K-complexes.    Other Non-Epileptiform Findings:  None were present.    Interictal Epileptiform Activity:   Rare to occasional left frontal spike wave discharges Max F7>Fp1.    Events:  Clinical events: None recorded.  Seizures: None recorded.    Activation Procedures:   Hyperventilation was not performed.    Photic stimulation was performed and did not elicit any abnormality.     Artifacts:  Intermittent myogenic and movement artifacts were noted.    ECG:  The heart rate on single channel ECG was predominantly between 70-80 BPM.  _____________________________________________________________  EEG SUMMARY/CLASSIFICATION    Abnormal EEG in the awake, drowsy and asleep states.  - Rare to occasional  left frontal spike wave discharges Max F7>Fp1.  - Continuos polymorphic delta and theta activities over the left hemisphere max frontotemporal region.   _____________________________________________________________  EEG IMPRESSION/CLINICAL CORRELATE    Abnormal EEG study.  1. Potential epileptogenic focus in the left frontal region.   2. Structural abnormality in the left hemisphere.   3. No seizures recorded.      Tammi Marcos MD  Adult EEG Fellow, [facility] Epilepsy [place]    Guido Husain MD  EEG / Epilepsy Attending Physician

## 2019-06-05 NOTE — PROGRESS NOTE ADULT - PROBLEM SELECTOR PLAN 1
Patient with history of seizures, on 3 antiepileptics  - neuro consulted, no need for IV meds for now and f/u AM drug levels.  - on home antiepileptics, including keppra 1000 TID, Dilantin 300 Qd, oxcarbazapine 300 TID.  - video EEG (-) for seizures, but has potential epileptogenic focus  - will monitor for seizure activity  - frequent neurochecks   - back to baseline as per patient and brother.   - currently symmetric strength Patient with history of seizures, on 3 antiepileptics  - neuro consulted, no need for IV meds for now and f/u AM drug levels. still in lab  - on home antiepileptics, including keppra 1000 TID, Dilantin 300 Qd, oxcarbazapine 300 TID.  - video EEG (-) for seizures, but has potential epileptogenic focus  - will monitor for seizure activity  - back to baseline as per patient and brother.   - currently symmetric strength

## 2019-06-05 NOTE — PHYSICAL THERAPY INITIAL EVALUATION ADULT - RANGE OF MOTION EXAMINATION, REHAB EVAL
dec. ROM R wrist and fingers, R ankle df to neutral/bilateral upper extremity ROM was WFL (within functional limits)/deficits as listed below/bilateral lower extremity ROM was WFL (within functional limits)

## 2019-06-05 NOTE — OCCUPATIONAL THERAPY INITIAL EVALUATION ADULT - RANGE OF MOTION EXAMINATION, UPPER EXTREMITY
Right shoulder/elbow active assistive WFL, wrist/hand contracted./Left UE Active ROM was WFL (within functional limits)

## 2019-06-05 NOTE — OCCUPATIONAL THERAPY INITIAL EVALUATION ADULT - ANTICIPATED DISCHARGE DISPOSITION, OT EVAL
home with no OT needs. Pt appears to be at baseline for ADLs and functional mobility. Pt will not be placed on OT program.

## 2019-06-05 NOTE — DISCHARGE NOTE PROVIDER - NSDCCPCAREPLAN_GEN_ALL_CORE_FT
PRINCIPAL DISCHARGE DIAGNOSIS  Diagnosis: Seizures  Assessment and Plan of Treatment: You had a seizure at home and in the emergency room. Please PRINCIPAL DISCHARGE DIAGNOSIS  Diagnosis: Seizures  Assessment and Plan of Treatment: You had a seizure at home and in the emergency room. We increased your dilantin to 300 every day. (please don't alternate, and take 300 every day). We checked your dilantin and keppra levels and they were both therapeutic. Your oxcarbazepene level was not back when we discharged you, but we will call you to switch the dosing if your level is low. If you don't get a call, please just take the meds as instructed. We did a video EEG which did not show any seizure activity. Please follow up with your neurologist in 1 week.      SECONDARY DISCHARGE DIAGNOSES  Diagnosis: Hypertension  Assessment and Plan of Treatment: You have history of hypertension, but your blood pressure was good in the hospital. Please take your metoprolol as directed and follow up with your PMD.

## 2019-06-06 ENCOUNTER — TRANSCRIPTION ENCOUNTER (OUTPATIENT)
Age: 29
End: 2019-06-06

## 2019-06-06 VITALS
OXYGEN SATURATION: 97 % | SYSTOLIC BLOOD PRESSURE: 144 MMHG | TEMPERATURE: 99 F | RESPIRATION RATE: 17 BRPM | DIASTOLIC BLOOD PRESSURE: 89 MMHG | HEART RATE: 81 BPM

## 2019-06-06 LAB
ALBUMIN SERPL ELPH-MCNC: 4.7 G/DL — SIGNIFICANT CHANGE UP (ref 3.3–5)
ALP SERPL-CCNC: 96 U/L — SIGNIFICANT CHANGE UP (ref 40–120)
ALT FLD-CCNC: 34 U/L — SIGNIFICANT CHANGE UP (ref 4–41)
ANION GAP SERPL CALC-SCNC: 13 MMO/L — SIGNIFICANT CHANGE UP (ref 7–14)
AST SERPL-CCNC: 19 U/L — SIGNIFICANT CHANGE UP (ref 4–40)
BASOPHILS # BLD AUTO: 0.03 K/UL — SIGNIFICANT CHANGE UP (ref 0–0.2)
BASOPHILS NFR BLD AUTO: 0.5 % — SIGNIFICANT CHANGE UP (ref 0–2)
BILIRUB SERPL-MCNC: 0.4 MG/DL — SIGNIFICANT CHANGE UP (ref 0.2–1.2)
BUN SERPL-MCNC: 9 MG/DL — SIGNIFICANT CHANGE UP (ref 7–23)
CALCIUM SERPL-MCNC: 9 MG/DL — SIGNIFICANT CHANGE UP (ref 8.4–10.5)
CHLORIDE SERPL-SCNC: 98 MMOL/L — SIGNIFICANT CHANGE UP (ref 98–107)
CO2 SERPL-SCNC: 27 MMOL/L — SIGNIFICANT CHANGE UP (ref 22–31)
CREAT SERPL-MCNC: 0.79 MG/DL — SIGNIFICANT CHANGE UP (ref 0.5–1.3)
EOSINOPHIL # BLD AUTO: 0.05 K/UL — SIGNIFICANT CHANGE UP (ref 0–0.5)
EOSINOPHIL NFR BLD AUTO: 0.8 % — SIGNIFICANT CHANGE UP (ref 0–6)
GLUCOSE SERPL-MCNC: 107 MG/DL — HIGH (ref 70–99)
HCT VFR BLD CALC: 48 % — SIGNIFICANT CHANGE UP (ref 39–50)
HGB BLD-MCNC: 15.7 G/DL — SIGNIFICANT CHANGE UP (ref 13–17)
IMM GRANULOCYTES NFR BLD AUTO: 0.2 % — SIGNIFICANT CHANGE UP (ref 0–1.5)
LYMPHOCYTES # BLD AUTO: 1.83 K/UL — SIGNIFICANT CHANGE UP (ref 1–3.3)
LYMPHOCYTES # BLD AUTO: 29.7 % — SIGNIFICANT CHANGE UP (ref 13–44)
MAGNESIUM SERPL-MCNC: 2 MG/DL — SIGNIFICANT CHANGE UP (ref 1.6–2.6)
MCHC RBC-ENTMCNC: 28.3 PG — SIGNIFICANT CHANGE UP (ref 27–34)
MCHC RBC-ENTMCNC: 32.7 % — SIGNIFICANT CHANGE UP (ref 32–36)
MCV RBC AUTO: 86.6 FL — SIGNIFICANT CHANGE UP (ref 80–100)
MONOCYTES # BLD AUTO: 0.47 K/UL — SIGNIFICANT CHANGE UP (ref 0–0.9)
MONOCYTES NFR BLD AUTO: 7.6 % — SIGNIFICANT CHANGE UP (ref 2–14)
NEUTROPHILS # BLD AUTO: 3.78 K/UL — SIGNIFICANT CHANGE UP (ref 1.8–7.4)
NEUTROPHILS NFR BLD AUTO: 61.2 % — SIGNIFICANT CHANGE UP (ref 43–77)
NRBC # FLD: 0 K/UL — SIGNIFICANT CHANGE UP (ref 0–0)
PHOSPHATE SERPL-MCNC: 3.1 MG/DL — SIGNIFICANT CHANGE UP (ref 2.5–4.5)
PLATELET # BLD AUTO: 233 K/UL — SIGNIFICANT CHANGE UP (ref 150–400)
PMV BLD: 10.6 FL — SIGNIFICANT CHANGE UP (ref 7–13)
POTASSIUM SERPL-MCNC: 3.9 MMOL/L — SIGNIFICANT CHANGE UP (ref 3.5–5.3)
POTASSIUM SERPL-SCNC: 3.9 MMOL/L — SIGNIFICANT CHANGE UP (ref 3.5–5.3)
PROT SERPL-MCNC: 8.3 G/DL — SIGNIFICANT CHANGE UP (ref 6–8.3)
RBC # BLD: 5.54 M/UL — SIGNIFICANT CHANGE UP (ref 4.2–5.8)
RBC # FLD: 13.3 % — SIGNIFICANT CHANGE UP (ref 10.3–14.5)
SODIUM SERPL-SCNC: 138 MMOL/L — SIGNIFICANT CHANGE UP (ref 135–145)
WBC # BLD: 6.17 K/UL — SIGNIFICANT CHANGE UP (ref 3.8–10.5)
WBC # FLD AUTO: 6.17 K/UL — SIGNIFICANT CHANGE UP (ref 3.8–10.5)

## 2019-06-06 PROCEDURE — 99239 HOSP IP/OBS DSCHRG MGMT >30: CPT

## 2019-06-06 RX ADMIN — LEVETIRACETAM 1000 MILLIGRAM(S): 250 TABLET, FILM COATED ORAL at 05:46

## 2019-06-06 RX ADMIN — Medication 300 MILLIGRAM(S): at 11:46

## 2019-06-06 RX ADMIN — OXCARBAZEPINE 300 MILLIGRAM(S): 300 TABLET, FILM COATED ORAL at 05:46

## 2019-06-06 NOTE — PROGRESS NOTE ADULT - PROBLEM SELECTOR PLAN 1
Patient with history of seizures, on 3 antiepileptics  - neuro consulted, no need for IV meds for now and f/u AM drug levels. oxcarbazepine still in lab.   - on home antiepileptics, including keppra 1000 TID, Dilantin 300 Qd, oxcarbazapine 300 TID.  - video EEG (-) for seizures, but has potential epileptogenic focus  - will monitor for seizure activity  - back to baseline as per patient and brother.   - currently symmetric strength Patient with history of seizures, on 3 antiepileptics  - neuro consulted, no need for IV meds for now and f/u AM drug levels. oxcarbazepine still in lab will be back tomorrow. Ok to d/c from neuro standpoint as long as we follow up the level tomorrow and let them know.   - on home antiepileptics, including keppra 1000 TID, Dilantin 300 Qd, oxcarbazapine 300 TID.  - video EEG (-) for seizures, but has potential epileptogenic focus  - will monitor for seizure activity  - back to baseline as per patient and brother.   - currently symmetric strength

## 2019-06-06 NOTE — DISCHARGE NOTE NURSING/CASE MANAGEMENT/SOCIAL WORK - NSDCDPATPORTLINK_GEN_ALL_CORE
You can access the [facility] Patient Portal, offered by [facility], by registering with the following website: [email or web address]

## 2019-06-06 NOTE — PROGRESS NOTE ADULT - SUBJECTIVE AND OBJECTIVE BOX
Ella Suyeon Yu PGY-1   [facility] Pager # [phone]  NS Pager # [phone]      Patient is a 29y old  Male who presents with a chief complaint of Seizures (05 Jun 2019 22:17)      SUBJECTIVE: No acute events overnight. Patient seen and examined at bedside.    REVIEW OF SYSTEMS:  CONSTITUTIONAL: No fever, weight loss, or fatigue  RESPIRATORY: No cough or shortness of breath  CARDIOVASCULAR: No chest pain, palpitations, dizziness, or leg swelling  GASTROINTESTINAL: No abdominal or epigastric pain. No nausea, vomiting, or hematemesis; No diarrhea or constipation. No melena or hematochezia.  GENITOURINARY: No dysuria  NEUROLOGICAL: No headaches  SKIN: No itching or lesions       MEDICATIONS  (STANDING):  enoxaparin Injectable 40 milliGRAM(s) SubCutaneous daily  levETIRAcetam 1000 milliGRAM(s) Oral <User Schedule>  OXcarbazepine 300 milliGRAM(s) Oral <User Schedule>  phenytoin   Capsule 300 milliGRAM(s) Oral daily    MEDICATIONS  (PRN):        Objective:    Vitals: Vital Signs Last 24 Hrs  T(C): 36.3 (06-06-19 @ 05:56), Max: 37 (06-05-19 @ 13:46)  T(F): 97.4 (06-06-19 @ 05:56), Max: 98.6 (06-05-19 @ 13:46)  HR: 60 (06-06-19 @ 05:56) (60 - 89)  BP: 127/81 (06-06-19 @ 05:56) (127/81 - 133/94)  BP(mean): --  RR: 18 (06-06-19 @ 05:56) (17 - 18)  SpO2: 97% (06-06-19 @ 05:56) (97% - 100%)            I&O's Summary      PHYSICAL EXAM:  GENERAL: NAD  HEAD:  Atraumatic, Normocephalic  CHEST/LUNG: Clear to auscultation bilaterally; No rales or wheezing  HEART: Regular rate and rhythm; No murmurs, rubs, or gallops  ABDOMEN: Soft, nontender, nondistended  EXTREMITIES:  No clubbing, cyanosis, or edema  LYMPH: No lymphadenopathy noted  SKIN: No rashes or lesions  NERVOUS SYSTEM:  Alert & Oriented X3    LABS:  06-04    140  |  101  |  14  ----------------------------<  121<H>  3.9   |  27  |  0.90    Ca    9.1      04 Jun 2019 01:50    TPro  7.5  /  Alb  4.5  /  TBili  < 0.2<L>  /  DBili  x   /  AST  30  /  ALT  44<H>  /  AlkPhos  88  06-04                                            14.4   11.72 )-----------( 231      ( 04 Jun 2019 01:50 )             44.7     CAPILLARY BLOOD GLUCOSE        RADIOLOGY:  Imaging Personally Reviewed: YES      Consultants involved in case: YES  Consultant(s) Notes Reviewed:  YES  Care Discussed with Consultants/Other Providers     Ella Suyeon Yu PGY-1 Ella Suyeon Yu PGY-1   [facility] Pager # [phone]  NS Pager # [phone]      Patient is a 29y old  Male who presents with a chief complaint of Seizures (05 Jun 2019 22:17)      SUBJECTIVE: No acute events overnight. Patient seen and examined at bedside. No complaints, feeling good, no seizures overnight.     REVIEW OF SYSTEMS:  CONSTITUTIONAL: No fever, weight loss, or fatigue  RESPIRATORY: No cough or shortness of breath  CARDIOVASCULAR: No chest pain, palpitations, dizziness, or leg swelling  GASTROINTESTINAL: No abdominal or epigastric pain. No nausea, vomiting, or hematemesis; No diarrhea or constipation. No melena or hematochezia.  GENITOURINARY: No dysuria  NEUROLOGICAL: No headaches  SKIN: No itching or lesions       MEDICATIONS  (STANDING):  enoxaparin Injectable 40 milliGRAM(s) SubCutaneous daily  levETIRAcetam 1000 milliGRAM(s) Oral <User Schedule>  OXcarbazepine 300 milliGRAM(s) Oral <User Schedule>  phenytoin   Capsule 300 milliGRAM(s) Oral daily    MEDICATIONS  (PRN):        Objective:    Vitals: Vital Signs Last 24 Hrs  T(C): 36.3 (06-06-19 @ 05:56), Max: 37 (06-05-19 @ 13:46)  T(F): 97.4 (06-06-19 @ 05:56), Max: 98.6 (06-05-19 @ 13:46)  HR: 60 (06-06-19 @ 05:56) (60 - 89)  BP: 127/81 (06-06-19 @ 05:56) (127/81 - 133/94)  BP(mean): --  RR: 18 (06-06-19 @ 05:56) (17 - 18)  SpO2: 97% (06-06-19 @ 05:56) (97% - 100%)            I&O's Summary      PHYSICAL EXAM:  GENERAL: NAD  CHEST/LUNG: Clear to auscultation bilaterally; No rales or wheezing  HEART: Regular rate and rhythm; No murmurs, rubs, or gallops  ABDOMEN: Soft, nontender, nondistended  EXTREMITIES:  No clubbing, cyanosis, or edema, R wrist contracture.  SKIN: No rashes or lesions  NERVOUS SYSTEM:  Alert & Oriented X3    LABS:  06-04    140  |  101  |  14  ----------------------------<  121<H>  3.9   |  27  |  0.90    Ca    9.1      04 Jun 2019 01:50    TPro  7.5  /  Alb  4.5  /  TBili  < 0.2<L>  /  DBili  x   /  AST  30  /  ALT  44<H>  /  AlkPhos  88  06-04                                            14.4   11.72 )-----------( 231      ( 04 Jun 2019 01:50 )             44.7     CAPILLARY BLOOD GLUCOSE        RADIOLOGY:  Imaging Personally Reviewed: YES      Consultants involved in case: YES  Consultant(s) Notes Reviewed:  YES  Care Discussed with Consultants/Other Providers     Ella Suyeon Yu PGY-1 Ella Suyeon Yu PGY-1   [facility] Pager # [phone]  NS Pager # [phone]      Patient is a 29y old  Male who presents with a chief complaint of Seizures (05 Jun 2019 22:17)      SUBJECTIVE: No acute events overnight. Patient seen and examined at bedside. No complaints, feeling good, no seizures overnight.     REVIEW OF SYSTEMS:  CONSTITUTIONAL: No fever, weight loss, or fatigue  RESPIRATORY: No cough or shortness of breath  CARDIOVASCULAR: No chest pain, palpitations, dizziness, or leg swelling  GASTROINTESTINAL: No abdominal or epigastric pain. No nausea, vomiting, or hematemesis; No diarrhea or constipation. No melena or hematochezia.  GENITOURINARY: No dysuria  NEUROLOGICAL: No headaches  SKIN: No itching or lesions       MEDICATIONS  (STANDING):  enoxaparin Injectable 40 milliGRAM(s) SubCutaneous daily  levETIRAcetam 1000 milliGRAM(s) Oral <User Schedule>  OXcarbazepine 300 milliGRAM(s) Oral <User Schedule>  phenytoin   Capsule 300 milliGRAM(s) Oral daily        Objective:    Vitals: Vital Signs Last 24 Hrs  T(C): 36.3 (06-06-19 @ 05:56), Max: 37 (06-05-19 @ 13:46)  T(F): 97.4 (06-06-19 @ 05:56), Max: 98.6 (06-05-19 @ 13:46)  HR: 60 (06-06-19 @ 05:56) (60 - 89)  BP: 127/81 (06-06-19 @ 05:56) (127/81 - 133/94)  BP(mean): --  RR: 18 (06-06-19 @ 05:56) (17 - 18)  SpO2: 97% (06-06-19 @ 05:56) (97% - 100%)            I&O's Summary      PHYSICAL EXAM:  GENERAL: NAD  CHEST/LUNG: Clear to auscultation bilaterally; No rales or wheezing  HEART: Regular rate and rhythm; No murmurs, rubs, or gallops  ABDOMEN: Soft, nontender, nondistended  EXTREMITIES:  No clubbing, cyanosis, or edema, R wrist contracture.  SKIN: No rashes or lesions  NERVOUS SYSTEM:  Alert & Oriented X3    LABS:  06-04    140  |  101  |  14  ----------------------------<  121<H>  3.9   |  27  |  0.90    Ca    9.1      04 Jun 2019 01:50    TPro  7.5  /  Alb  4.5  /  TBili  < 0.2<L>  /  DBili  x   /  AST  30  /  ALT  44<H>  /  AlkPhos  88  06-04                                       14.4   11.72 )-----------( 231      ( 04 Jun 2019 01:50 )             44.7           RADIOLOGY:  Imaging Personally Reviewed: YES      Consultants involved in case: YES  Consultant(s) Notes Reviewed:  YES  Care Discussed with Consultants/Other Providers     Ella Suyeon Yu PGY-1

## 2019-06-06 NOTE — PROGRESS NOTE ADULT - PROBLEM SELECTOR PLAN 2
Pt w history of hypertension  - states it's from his antiseizure medications  - patient was hypertensive on admission, but likely secondary to seizures  - now normotensive  - will monitor off his home med, metoprolol 25 BID for now.
Pt w history of hypertension  - states it's from his antiseizure medications  - patient was hypertensive on admission, but likely secondary to seizures  - now normotensive  - will monitor off his home med, metoprolol 25 BID for now.

## 2019-06-06 NOTE — PROGRESS NOTE ADULT - ATTENDING COMMENTS
Patient seen and examined.  Case discussed with house staff.  Agree with above as edited.   Patient is a 29yoM with h/o seizure disorder s/p  shunt admitted with 4 seizures in a short time span.  Neuro eval appreciated  Plan discussed with patient and family.  d/c home today. Recommending Dilantin 300mg every day rather than alternating 300/200  Case d/w Case management, social work and nursing on IDRs Patient seen and examined.  Case discussed with house staff.  Agree with above as edited.   Patient is a 29yoM with h/o seizure disorder s/p  shunt admitted with 4 seizures in a short time span.  Neuro eval appreciated  Plan discussed with patient and family.  d/c home today. Recommending Dilantin 300mg every day rather than alternating 300/200  D/C time: 35 minutes   Case d/w Case management, social work and nursing on IDRs

## 2019-06-06 NOTE — PROGRESS NOTE ADULT - PROBLEM SELECTOR PLAN 3
Pt moves around at baseline  DVT PPX w lovenox.  -OT and PT consult, as patient has contractures
Pt moves around at baseline  DVT PPX w lovenox.  -OT and PT consult, as patient has contractures  PT OT no needs

## 2019-06-07 LAB — OXCARBAZEPINE SERPL-MCNC: 11 UG/ML — SIGNIFICANT CHANGE UP (ref 10–35)

## 2020-07-14 NOTE — ED PROVIDER NOTE - PROGRESS NOTE DETAILS
Chart reviewed.   Immunizations: Triggered Imm Registry     Orders placed: n/a  Upcoming appts to satisfy ELLY topics: n/a       Guszack: Fever and tachycardia resolved, labs reassuring with no leukocytosis or other metabolic derangements. Evaluated by neurology, recommend increasing trileptal to 300/300/600, and can follow up as an outpatient. Per neurosurgery, if shunt not revised within the last 6 months the risk of infection is minimal and the risk of introducing an infection outweighs the benefit of obtaining the sample, as pt has no suggestion of central infection on exam. Pelonzack: Fever and tachycardia resolved, labs reassuring with no leukocytosis or other metabolic derangements. Evaluated by neurology, recommend increasing trileptal to 300/300/600, and can follow up as an outpatient. Per neurosurgery, if shunt not revised within the last 6 months the risk of infection is minimal and the risk of introducing an infection outweighs the benefit of obtaining the sample, as pt has no suggestion of central infection on exam. Took home doses of AEDs in the ED. Discussed the above with pt and brother. Strict return precautions given for any continued seizures, fever or other infectious symptoms.

## 2020-11-06 NOTE — ED PROVIDER NOTE - NSCAREINITIATED _GEN_ER
Pt yadi 3.5 hour hemodialysis well.  3 liters net fluid removed. 85.2 liters processed. Pt discharged from dialysis. Alexandro Garcia(Resident)

## 2023-10-24 NOTE — H&P ADULT - NSICDXFAMILYHX_GEN_ALL_CORE_FT
performed. On external stress test there was found to be no increase in medial clear space, on contact as there was no instability at the level of the syndesmosis. However there was significant valgus tilt to the ankle with excessive inversion and positive anterior drawer. Given these findings we felt that there was disruption of the lateral ankle ligament complex. Given intraoperative findings, preoperative radiographs demonstrated old remote avulsion fracture of distal fibula, clinical complaints of ankle instability we move forward with secondary repair of lateral ankle ligaments. ATFL. At this time we move forward with secondary repair of the anterior tibiofibular ligament and calcaneofibular ligament. A incision was made over the distal fibula and extended distally to the level of the sinus tarsi utilizing a #15 blade. The incision was deepened in a layered fashion which care taken to protect and retract all neurovascular structures. Attention was then directed to the distal fibula and the body of the talus, dissection was continued to the level of bone. There was noted to be poor soft tissue cuff at this level from chronic degeneration with superimposed acute trauma to the ankle. At this time we proceeded with secondary repair of the anterior tibiofibular ligament and calcaneofibular ligament. Utilizing Arthrex internal brace and fiber tacks. Following manufactures instructions 2 fiber tacks were inserted into the distal fibula. A peek anchor was inserted into the body of the talus with care taken not to violate the subtalar joint or the ankle joint. A second anchor was then inserted into the lateral wall of the calcaneus with care taken to avoid the subtalar joint and entrapment of the peroneal tendons. Placement of anchors was confirmed under intraoperative fluoroscopy.   Stress testing was again performed status post Arthrex internal brace repair the talar tilt test was negative and there was No pertinent family history in first degree relatives

## 2024-03-12 RX ORDER — METOPROLOL TARTRATE 50 MG
1 TABLET ORAL
Qty: 0 | Refills: 0 | DISCHARGE

## 2024-03-12 RX ORDER — LEVETIRACETAM 250 MG/1
0 TABLET, FILM COATED ORAL
Qty: 0 | Refills: 0 | DISCHARGE

## 2024-03-12 RX ORDER — OXCARBAZEPINE 300 MG/1
0 TABLET, FILM COATED ORAL
Qty: 0 | Refills: 0 | DISCHARGE

## 2025-01-08 NOTE — ED ADULT TRIAGE NOTE - BP NONINVASIVE DIASTOLIC (MM HG)
Spoke with pt and his wife  Pt is going for type and screen today and 2 units of PRBC tomorrow 3/8/19 at 9:00 am at [id number] 
[id number]
Warm

## 2025-04-21 ENCOUNTER — OFFICE (OUTPATIENT)
Facility: LOCATION | Age: 35
Setting detail: OPHTHALMOLOGY
End: 2025-04-21
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
## 2025-04-21 PROCEDURE — NO SHOW FE NO SHOW FEE: Performed by: STUDENT IN AN ORGANIZED HEALTH CARE EDUCATION/TRAINING PROGRAM
